# Patient Record
Sex: MALE | Race: WHITE | NOT HISPANIC OR LATINO | URBAN - METROPOLITAN AREA
[De-identification: names, ages, dates, MRNs, and addresses within clinical notes are randomized per-mention and may not be internally consistent; named-entity substitution may affect disease eponyms.]

---

## 2017-02-01 ENCOUNTER — GENERIC CONVERSION - ENCOUNTER (OUTPATIENT)
Dept: OTHER | Facility: OTHER | Age: 62
End: 2017-02-01

## 2023-02-13 ENCOUNTER — OFFICE VISIT (OUTPATIENT)
Dept: INTERNAL MEDICINE CLINIC | Facility: CLINIC | Age: 68
End: 2023-02-13

## 2023-02-13 VITALS
WEIGHT: 249.6 LBS | BODY MASS INDEX: 35.73 KG/M2 | HEIGHT: 70 IN | SYSTOLIC BLOOD PRESSURE: 210 MMHG | HEART RATE: 87 BPM | TEMPERATURE: 99 F | DIASTOLIC BLOOD PRESSURE: 108 MMHG | OXYGEN SATURATION: 96 % | RESPIRATION RATE: 14 BRPM

## 2023-02-13 DIAGNOSIS — F52.21 ERECTILE DYSFUNCTION OF NON-ORGANIC ORIGIN: ICD-10-CM

## 2023-02-13 DIAGNOSIS — E78.00 PURE HYPERCHOLESTEROLEMIA: ICD-10-CM

## 2023-02-13 DIAGNOSIS — Z12.5 SCREENING PSA (PROSTATE SPECIFIC ANTIGEN): ICD-10-CM

## 2023-02-13 DIAGNOSIS — L71.9 ROSACEA: ICD-10-CM

## 2023-02-13 DIAGNOSIS — Z12.11 SCREEN FOR COLON CANCER: Primary | ICD-10-CM

## 2023-02-13 DIAGNOSIS — I10 PRIMARY HYPERTENSION: ICD-10-CM

## 2023-02-13 RX ORDER — LISINOPRIL 10 MG/1
10 TABLET ORAL DAILY
Qty: 90 TABLET | Refills: 3 | Status: SHIPPED | OUTPATIENT
Start: 2023-02-13

## 2023-02-13 NOTE — PROGRESS NOTES
Assessment/Plan:    Problem List Items Addressed This Visit        Cardiovascular and Mediastinum    Hypertension     Start Lisinopril 10 mg QDay  Check Renal function and microalbumin         Relevant Medications    lisinopril (ZESTRIL) 10 mg tablet    Other Relevant Orders    Comprehensive metabolic panel    Lipid Panel with Direct LDL reflex    CBC and differential    TSH, 3rd generation    Microalbumin / creatinine urine ratio       Musculoskeletal and Integument    Rosacea     Declined treatment at this time  Discussed Doxy and MetroGel            Other    Erectile dysfunction of non-organic origin     Declined follow up at this time         Pure hypercholesterolemia     Check the patient Lipid panel noted prior LDL cholesterol was elevated at 149 mg/dL        Other Visit Diagnoses     Screen for colon cancer    -  Primary    Relevant Orders    Cologuard    Screening PSA (prostate specific antigen)        Relevant Orders    PSA, Total Screen           Diagnoses and all orders for this visit:    Screen for colon cancer  -     Cologuard    Primary hypertension  -     Comprehensive metabolic panel; Future  -     Lipid Panel with Direct LDL reflex; Future  -     CBC and differential; Future  -     lisinopril (ZESTRIL) 10 mg tablet; Take 1 tablet (10 mg total) by mouth daily  -     TSH, 3rd generation; Future  -     Cancel: POCT urine microalbumin/creatinine  -     Microalbumin / creatinine urine ratio    Screening PSA (prostate specific antigen)  -     PSA, Total Screen;  Future    Rosacea    Pure hypercholesterolemia    Erectile dysfunction of non-organic origin      Hypertension  Start Lisinopril 10 mg QDay  Check Renal function and microalbumin    Rosacea  Declined treatment at this time  Discussed Doxy and MetroGel    Pure hypercholesterolemia  Check the patient Lipid panel noted prior LDL cholesterol was elevated at 149 mg/dL    Erectile dysfunction of non-organic origin  Declined follow up at this time        Subjective:      Patient ID: Vasile Lama is a 78 y o  male  The patient was seen and examined and noted to have issues with HTN and is follow up secondary to that      The following portions of the patient's history were reviewed and updated as appropriate:   He has no past medical history on file  ,  does not have any pertinent problems on file  ,   has no past surgical history on file  ,  family history is not on file  ,   reports that he has quit smoking  His smoking use included cigarettes  He has been exposed to tobacco smoke  He has never used smokeless tobacco  He reports current alcohol use  He reports that he does not use drugs  ,  has No Known Allergies     Current Outpatient Medications   Medication Sig Dispense Refill   • lisinopril (ZESTRIL) 10 mg tablet Take 1 tablet (10 mg total) by mouth daily 90 tablet 3     No current facility-administered medications for this visit  Review of Systems   Constitutional: Negative for chills, fatigue and fever  HENT: Negative  Respiratory: Negative for cough, chest tightness and shortness of breath  Cardiovascular: Negative for chest pain and palpitations  Gastrointestinal: Negative for abdominal pain, constipation, diarrhea, nausea and vomiting  Genitourinary: Negative  Musculoskeletal: Negative for back pain and myalgias  Skin: Negative  Neurological: Negative  Psychiatric/Behavioral: Negative for dysphoric mood  The patient is not nervous/anxious  Objective:  Vitals:    02/13/23 1421   BP: (!) 210/108   Pulse: 87   Resp: 14   Temp: 99 °F (37 2 °C)   SpO2: 96%   Weight: 113 kg (249 lb 9 6 oz)   Height: 5' 10" (1 778 m)     Body mass index is 35 81 kg/m²  Physical Exam  Vitals and nursing note reviewed  Constitutional:       Appearance: He is well-developed  HENT:      Head: Normocephalic and atraumatic  Eyes:      Pupils: Pupils are equal, round, and reactive to light     Cardiovascular:      Rate and Rhythm: Normal rate and regular rhythm  Heart sounds: Normal heart sounds  No murmur heard  Pulmonary:      Effort: Pulmonary effort is normal       Breath sounds: Normal breath sounds  No stridor  No rales  Abdominal:      General: Bowel sounds are normal  There is no distension  Palpations: Abdomen is soft  Tenderness: There is no abdominal tenderness  Musculoskeletal:         General: No deformity  Normal range of motion  Cervical back: Normal range of motion and neck supple  Skin:     General: Skin is warm and dry  Neurological:      Mental Status: He is alert and oriented to person, place, and time            PHQ-2/9 Depression Screening    Little interest or pleasure in doing things: 0 - not at all  Feeling down, depressed, or hopeless: 0 - not at all  PHQ-2 Score: 0  PHQ-2 Interpretation: Negative depression screen

## 2023-02-14 ENCOUNTER — LAB (OUTPATIENT)
Dept: LAB | Facility: CLINIC | Age: 68
End: 2023-02-14

## 2023-02-14 DIAGNOSIS — I10 PRIMARY HYPERTENSION: ICD-10-CM

## 2023-02-14 DIAGNOSIS — Z12.5 SCREENING PSA (PROSTATE SPECIFIC ANTIGEN): ICD-10-CM

## 2023-02-14 LAB
ALBUMIN SERPL BCP-MCNC: 3.8 G/DL (ref 3.5–5)
ALP SERPL-CCNC: 54 U/L (ref 46–116)
ALT SERPL W P-5'-P-CCNC: 34 U/L (ref 12–78)
ANION GAP SERPL CALCULATED.3IONS-SCNC: 3 MMOL/L (ref 4–13)
AST SERPL W P-5'-P-CCNC: 33 U/L (ref 5–45)
BASOPHILS # BLD AUTO: 0.05 THOUSANDS/ÂΜL (ref 0–0.1)
BASOPHILS NFR BLD AUTO: 1 % (ref 0–1)
BILIRUB SERPL-MCNC: 0.46 MG/DL (ref 0.2–1)
BUN SERPL-MCNC: 19 MG/DL (ref 5–25)
CALCIUM SERPL-MCNC: 9.1 MG/DL (ref 8.3–10.1)
CHLORIDE SERPL-SCNC: 107 MMOL/L (ref 96–108)
CHOLEST SERPL-MCNC: 252 MG/DL
CO2 SERPL-SCNC: 28 MMOL/L (ref 21–32)
CREAT SERPL-MCNC: 0.96 MG/DL (ref 0.6–1.3)
CREAT UR-MCNC: 95.3 MG/DL
EOSINOPHIL # BLD AUTO: 0.07 THOUSAND/ÂΜL (ref 0–0.61)
EOSINOPHIL NFR BLD AUTO: 1 % (ref 0–6)
ERYTHROCYTE [DISTWIDTH] IN BLOOD BY AUTOMATED COUNT: 12.7 % (ref 11.6–15.1)
GFR SERPL CREATININE-BSD FRML MDRD: 82 ML/MIN/1.73SQ M
GLUCOSE P FAST SERPL-MCNC: 98 MG/DL (ref 65–99)
HCT VFR BLD AUTO: 41.2 % (ref 36.5–49.3)
HDLC SERPL-MCNC: 63 MG/DL
HGB BLD-MCNC: 14 G/DL (ref 12–17)
IMM GRANULOCYTES # BLD AUTO: 0.01 THOUSAND/UL (ref 0–0.2)
IMM GRANULOCYTES NFR BLD AUTO: 0 % (ref 0–2)
LDLC SERPL CALC-MCNC: 177 MG/DL (ref 0–100)
LYMPHOCYTES # BLD AUTO: 1.83 THOUSANDS/ÂΜL (ref 0.6–4.47)
LYMPHOCYTES NFR BLD AUTO: 29 % (ref 14–44)
MCH RBC QN AUTO: 29.6 PG (ref 26.8–34.3)
MCHC RBC AUTO-ENTMCNC: 34 G/DL (ref 31.4–37.4)
MCV RBC AUTO: 87 FL (ref 82–98)
MICROALBUMIN UR-MCNC: 9.8 MG/L (ref 0–20)
MICROALBUMIN/CREAT 24H UR: 10 MG/G CREATININE (ref 0–30)
MONOCYTES # BLD AUTO: 0.62 THOUSAND/ÂΜL (ref 0.17–1.22)
MONOCYTES NFR BLD AUTO: 10 % (ref 4–12)
NEUTROPHILS # BLD AUTO: 3.68 THOUSANDS/ÂΜL (ref 1.85–7.62)
NEUTS SEG NFR BLD AUTO: 59 % (ref 43–75)
NRBC BLD AUTO-RTO: 0 /100 WBCS
PLATELET # BLD AUTO: 212 THOUSANDS/UL (ref 149–390)
PMV BLD AUTO: 10.9 FL (ref 8.9–12.7)
POTASSIUM SERPL-SCNC: 4.6 MMOL/L (ref 3.5–5.3)
PROT SERPL-MCNC: 6.8 G/DL (ref 6.4–8.4)
PSA SERPL-MCNC: 1.1 NG/ML (ref 0–4)
RBC # BLD AUTO: 4.73 MILLION/UL (ref 3.88–5.62)
SODIUM SERPL-SCNC: 138 MMOL/L (ref 135–147)
TRIGL SERPL-MCNC: 59 MG/DL
TSH SERPL DL<=0.05 MIU/L-ACNC: 3.34 UIU/ML (ref 0.45–4.5)
WBC # BLD AUTO: 6.26 THOUSAND/UL (ref 4.31–10.16)

## 2023-02-15 NOTE — RESULT ENCOUNTER NOTE
LDL cholesterol is noted to be elevated  Other labs are noted to be good  We will discuss at our next follow up

## 2023-05-15 ENCOUNTER — OFFICE VISIT (OUTPATIENT)
Dept: INTERNAL MEDICINE CLINIC | Facility: CLINIC | Age: 68
End: 2023-05-15

## 2023-05-15 VITALS
HEART RATE: 72 BPM | WEIGHT: 245.6 LBS | TEMPERATURE: 98.6 F | SYSTOLIC BLOOD PRESSURE: 156 MMHG | RESPIRATION RATE: 12 BRPM | DIASTOLIC BLOOD PRESSURE: 80 MMHG | HEIGHT: 70 IN | OXYGEN SATURATION: 97 % | BODY MASS INDEX: 35.16 KG/M2

## 2023-05-15 DIAGNOSIS — Z00.00 MEDICARE ANNUAL WELLNESS VISIT, SUBSEQUENT: Primary | ICD-10-CM

## 2023-05-15 DIAGNOSIS — R09.89 BRUIT OF RIGHT CAROTID ARTERY: ICD-10-CM

## 2023-05-15 DIAGNOSIS — E78.00 PURE HYPERCHOLESTEROLEMIA: ICD-10-CM

## 2023-05-15 DIAGNOSIS — I10 PRIMARY HYPERTENSION: ICD-10-CM

## 2023-05-15 RX ORDER — LISINOPRIL 10 MG/1
10 TABLET ORAL DAILY
Qty: 90 TABLET | Refills: 3 | Status: SHIPPED | OUTPATIENT
Start: 2023-05-15

## 2023-05-15 RX ORDER — ROSUVASTATIN CALCIUM 5 MG/1
5 TABLET, COATED ORAL DAILY
Qty: 90 TABLET | Refills: 1 | Status: SHIPPED | OUTPATIENT
Start: 2023-05-15 | End: 2023-11-11

## 2023-05-15 NOTE — PROGRESS NOTES
Assessment and Plan:     Problem List Items Addressed This Visit        Cardiovascular and Mediastinum    Hypertension     Refilled the Lisinopril 10 mg QDay         Relevant Medications    lisinopril (ZESTRIL) 10 mg tablet       Other    Pure hypercholesterolemia - Primary     Started the Crestor 5 mg QDay #90  Follow up LDL cholesterol in 1 month         Relevant Medications    rosuvastatin (CRESTOR) 5 mg tablet    Other Relevant Orders    LDL cholesterol, direct   Other Visit Diagnoses     Bruit of right carotid artery        Cortid doppler ordered secondary to right sided bruit  Relevant Orders    VAS carotid complete study           Preventive health issues were discussed with patient, and age appropriate screening tests were ordered as noted in patient's After Visit Summary  Personalized health advice and appropriate referrals for health education or preventive services given if needed, as noted in patient's After Visit Summary  History of Present Illness:     Patient presents for a Medicare Wellness Visit    The patient's labs noted an elevated LDL cholesterol     Patient Care Team:  Michelet Banegas DO as PCP - Vishal Boone MD     Review of Systems:     Review of Systems   Constitutional: Negative for chills, fatigue and fever  HENT: Negative  Respiratory: Negative for cough, chest tightness and shortness of breath  Cardiovascular: Negative for chest pain and palpitations  Gastrointestinal: Negative for abdominal pain, constipation, diarrhea, nausea and vomiting  Genitourinary: Negative  Musculoskeletal: Negative for back pain and myalgias  Skin: Negative  Neurological: Negative  Psychiatric/Behavioral: Negative for dysphoric mood  The patient is not nervous/anxious           Problem List:     Patient Active Problem List   Diagnosis   • Erectile dysfunction of non-organic origin   • Hypertension   • Pure hypercholesterolemia   • Rosacea      Past Medical and Surgical History:     History reviewed  No pertinent past medical history  History reviewed  No pertinent surgical history  Family History:     Family History   Problem Relation Age of Onset   • Dementia Mother    • Heart disease Mother    • Stroke Father    • Heart disease Father       Social History:     Social History     Socioeconomic History   • Marital status: /Civil Union     Spouse name: None   • Number of children: None   • Years of education: None   • Highest education level: None   Occupational History   • None   Tobacco Use   • Smoking status: Former     Types: Cigarettes     Passive exposure: Past   • Smokeless tobacco: Never   Substance and Sexual Activity   • Alcohol use: Yes     Comment: social   • Drug use: Never   • Sexual activity: Not Currently   Other Topics Concern   • None   Social History Narrative   • None     Social Determinants of Health     Financial Resource Strain: Low Risk    • Difficulty of Paying Living Expenses: Not hard at all   Food Insecurity: Not on file   Transportation Needs: No Transportation Needs   • Lack of Transportation (Medical): No   • Lack of Transportation (Non-Medical): No   Physical Activity: Not on file   Stress: Not on file   Social Connections: Not on file   Intimate Partner Violence: Not on file   Housing Stability: Not on file      Medications and Allergies:     Current Outpatient Medications   Medication Sig Dispense Refill   • lisinopril (ZESTRIL) 10 mg tablet Take 1 tablet (10 mg total) by mouth daily 90 tablet 3   • rosuvastatin (CRESTOR) 5 mg tablet Take 1 tablet (5 mg total) by mouth daily 90 tablet 1     No current facility-administered medications for this visit       No Known Allergies   Immunizations:     Immunization History   Administered Date(s) Administered   • COVID-19 MODERNA VACC 0 5 ML IM 01/23/2021, 02/20/2021, 10/30/2021, 07/24/2022   • COVID-19 Moderna Vac BIVALENT 12 Yr+ IM (BOOSTER ONLY) 0 5 ML 10/24/2022   • DT (pediatric) 04/19/2012   • INFLUENZA 10/17/2014, 10/24/2022   • Influenza, seasonal, injectable 01/10/2013   • Pneumococcal Conjugate Vaccine 20-valent (Pcv20), Polysace 10/24/2022      Health Maintenance:         Topic Date Due   • Hepatitis C Screening  Never done   • Colorectal Cancer Screening  Never done     There are no preventive care reminders to display for this patient  Medicare Screening Tests and Risk Assessments:     Nato Barillas is here for his Initial Wellness visit  Health Risk Assessment:   Patient rates overall health as good  Patient feels that their physical health rating is same  Patient is very satisfied with their life  Eyesight was rated as same  Hearing was rated as slightly worse  Patient feels that their emotional and mental health rating is same  Patients states they are sometimes angry  Patient states they are often unusually tired/fatigued  Pain experienced in the last 7 days has been some  Patient's pain rating has been 2/10  Patient states that he has experienced no weight loss or gain in last 6 months  Depression Screening:   PHQ-2 Score: 1      Fall Risk Screening: In the past year, patient has experienced: no history of falling in past year      Home Safety:  Patient does not have trouble with stairs inside or outside of their home  Patient has no working smoke alarms and has working carbon monoxide detector  Home safety hazards include: none  Nutrition:   Current diet is Frequent junk food and Other (please comment)  high fat content    Medications:   Patient is currently taking over-the-counter supplements  OTC medications include: Aleve (not daily)  Patient is able to manage medications  Activities of Daily Living (ADLs)/Instrumental Activities of Daily Living (IADLs):   Walk and transfer into and out of bed and chair?: Yes  Dress and groom yourself?: Yes    Bathe or shower yourself?: Yes    Feed yourself?  Yes  Do your laundry/housekeeping?: Yes  Manage your money, pay your bills and track your expenses?: Yes  Make your own meals?: Yes    Do your own shopping?: Yes    Durable Medical Equipment Suppliers  None    Previous Hospitalizations:   Any hospitalizations or ED visits within the last 12 months?: No      Advance Care Planning:   Living will: Yes    Durable POA for healthcare: Yes    Advanced directive: Yes    Advanced directive counseling given: No    Five wishes given: No    Patient declined ACP directive: No    End of Life Decisions reviewed with patient: No    Provider agrees with end of life decisions: Yes      Cognitive Screening:   Provider or family/friend/caregiver concerned regarding cognition?: No    PREVENTIVE SCREENINGS      Cardiovascular Screening:    General: Screening Not Indicated and History Lipid Disorder    Due for: Lipid Panel      Diabetes Screening:     General: Screening Current      Colorectal Cancer Screening:       Due for: Cologuard      Prostate Cancer Screening:    General: Screening Current      Osteoporosis Screening:    General: Screening Not Indicated      Abdominal Aortic Aneurysm (AAA) Screening:    Risk factors include: age between 73-69 yo and tobacco use        General: Patient Declines      Lung Cancer Screening:     General: Screening Not Indicated      Hepatitis C Screening:    General: Screening Not Indicated    Screening, Brief Intervention, and Referral to Treatment (SBIRT)    Screening  Typical number of drinks in a day: 2  Typical number of drinks in a week: 10  Interpretation: Low risk drinking behavior  AUDIT-C Screenin) How often did you have a drink containing alcohol in the past year? 4 or more times a week  2) How many drinks did you have on a typical day when you were drinking in the past year?  1 to 2  3) How often did you have 6 or more drinks on one occasion in the past year? less than monthly    AUDIT-C Score: 5  Interpretation: Score 4-12 (male): POSITIVE screen for alcohol misuse    AUDIT Screenin) How often "during the last year have you found that you were not able to stop drinking once you had started? 0 - never  5) How often during the last year have you failed to do what was normally expected from you because of drinking? 0 - never  6) How often during the last year have you needed a first drink in the morning to get yourself going after a heavy drinking session? 0 - never  7) How often during the last year have you had a feeling of guilt or remorse after drinking? 1 - less than monthly  8) How often during the last year have you been unable to remember what happened the night before because you had been drinking? 0 - never  9) Have you or someone else been injured as a result of your drinking? 0 - no  10) Has a relative or friend or a doctor or another health worker been concerned about your drinking or suggested you cut down? 0 - no    AUDIT Score: 6  Interpretation: Low risk alcohol consumption    Single Item Drug Screening:  How often have you used an illegal drug (including marijuana) or a prescription medication for non-medical reasons in the past year? never    Single Item Drug Screen Score: 0  Interpretation: Negative screen for possible drug use disorder    No results found  Physical Exam:     /80   Pulse 72   Temp 98 6 °F (37 °C)   Resp 12   Ht 5' 10\" (1 778 m)   Wt 111 kg (245 lb 9 6 oz)   SpO2 97%   BMI 35 24 kg/m²     Physical Exam  Vitals and nursing note reviewed  Constitutional:       General: He is not in acute distress  Appearance: He is well-developed  HENT:      Head: Normocephalic and atraumatic  Eyes:      Conjunctiva/sclera: Conjunctivae normal    Neck:      Vascular: Carotid bruit present  Cardiovascular:      Rate and Rhythm: Normal rate and regular rhythm  Heart sounds: No murmur heard  Pulmonary:      Effort: Pulmonary effort is normal  No respiratory distress  Breath sounds: Normal breath sounds  Abdominal:      Palpations: Abdomen is soft        " Tenderness: There is no abdominal tenderness  Musculoskeletal:         General: No swelling  Cervical back: Neck supple  Skin:     General: Skin is warm and dry  Capillary Refill: Capillary refill takes less than 2 seconds  Neurological:      Mental Status: He is alert     Psychiatric:         Mood and Affect: Mood normal           Adina Craig DO

## 2023-05-15 NOTE — PATIENT INSTRUCTIONS
Medicare Preventive Visit Patient Instructions  Thank you for completing your Welcome to Medicare Visit or Medicare Annual Wellness Visit today  Your next wellness visit will be due in one year (5/15/2024)  The screening/preventive services that you may require over the next 5-10 years are detailed below  Some tests may not apply to you based off risk factors and/or age  Screening tests ordered at today's visit but not completed yet may show as past due  Also, please note that scanned in results may not display below  Preventive Screenings:  Service Recommendations Previous Testing/Comments   Colorectal Cancer Screening  · Colonoscopy    · Fecal Occult Blood Test (FOBT)/Fecal Immunochemical Test (FIT)  · Fecal DNA/Cologuard Test  · Flexible Sigmoidoscopy Age: 39-70 years old   Colonoscopy: every 10 years (May be performed more frequently if at higher risk)  OR  FOBT/FIT: every 1 year  OR  Cologuard: every 3 years  OR  Sigmoidoscopy: every 5 years  Screening may be recommended earlier than age 39 if at higher risk for colorectal cancer  Also, an individualized decision between you and your healthcare provider will decide whether screening between the ages of 74-80 would be appropriate   Colonoscopy: Not on file  FOBT/FIT: Not on file  Cologuard: Not on file  Sigmoidoscopy: Not on file          Prostate Cancer Screening Individualized decision between patient and health care provider in men between ages of 53-78   Medicare will cover every 12 months beginning on the day after your 50th birthday PSA: 1 1 ng/mL     Screening Current     Hepatitis C Screening Once for adults born between [facility]  More frequently in patients at high risk for Hepatitis C Hep C Antibody: Not on file        Diabetes Screening 1-2 times per year if you're at risk for diabetes or have pre-diabetes Fasting glucose: 98 mg/dL (2/14/2023)  A1C: No results in last 5 years (No results in last 5 years)  Screening Current   Cholesterol Screening Once every 5 years if you don't have a lipid disorder  May order more often based on risk factors  Lipid panel: 02/14/2023  Screening Not Indicated  History Lipid Disorder      Other Preventive Screenings Covered by Medicare:  1  Abdominal Aortic Aneurysm (AAA) Screening: covered once if your at risk  You're considered to be at risk if you have a family history of AAA or a male between the age of 73-68 who smoking at least 100 cigarettes in your lifetime  2  Lung Cancer Screening: covers low dose CT scan once per year if you meet all of the following conditions: (1) Age 50-69; (2) No signs or symptoms of lung cancer; (3) Current smoker or have quit smoking within the last 15 years; (4) You have a tobacco smoking history of at least 20 pack years (packs per day x number of years you smoked); (5) You get a written order from a healthcare provider  3  Glaucoma Screening: covered annually if you're considered high risk: (1) You have diabetes OR (2) Family history of glaucoma OR (3)  aged 48 and older OR (3)  American aged 72 and older  3  Osteoporosis Screening: covered every 2 years if you meet one of the following conditions: (1) Have a vertebral abnormality; (2) On glucocorticoid therapy for more than 3 months; (3) Have primary hyperparathyroidism; (4) On osteoporosis medications and need to assess response to drug therapy  5  HIV Screening: covered annually if you're between the age of 12-76  Also covered annually if you are younger than 13 and older than 72 with risk factors for HIV infection  For pregnant patients, it is covered up to 3 times per pregnancy      Immunizations:  Immunization Recommendations   Influenza Vaccine Annual influenza vaccination during flu season is recommended for all persons aged >= 6 months who do not have contraindications   Pneumococcal Vaccine   * Pneumococcal conjugate vaccine = PCV13 (Prevnar 13), PCV15 (Vaxneuvance), PCV20 (Prevnar 20)  * Pneumococcal polysaccharide vaccine = PPSV23 (Pneumovax) Adults 2364 years old: 1-3 doses may be recommended based on certain risk factors  Adults 72 years old: 1-2 doses may be recommended based off what pneumonia vaccine you previously received   Hepatitis B Vaccine 3 dose series if at intermediate or high risk (ex: diabetes, end stage renal disease, liver disease)   Tetanus (Td) Vaccine - COST NOT COVERED BY MEDICARE PART B Following completion of primary series, a booster dose should be given every 10 years to maintain immunity against tetanus  Td may also be given as tetanus wound prophylaxis  Tdap Vaccine - COST NOT COVERED BY MEDICARE PART B Recommended at least once for all adults  For pregnant patients, recommended with each pregnancy  Shingles Vaccine (Shingrix) - COST NOT COVERED BY MEDICARE PART B  2 shot series recommended in those aged 48 and above     Health Maintenance Due:      Topic Date Due   • Hepatitis C Screening  Never done   • Colorectal Cancer Screening  Never done     Immunizations Due:  There are no preventive care reminders to display for this patient  Advance Directives   What are advance directives? Advance directives are legal documents that state your wishes and plans for medical care  These plans are made ahead of time in case you lose your ability to make decisions for yourself  Advance directives can apply to any medical decision, such as the treatments you want, and if you want to donate organs  What are the types of advance directives? There are many types of advance directives, and each state has rules about how to use them  You may choose a combination of any of the following:  · Living will: This is a written record of the treatment you want  You can also choose which treatments you do not want, which to limit, and which to stop at a certain time  This includes surgery, medicine, IV fluid, and tube feedings     · Durable power of  for healthcare [facility]): This is a written record that states who you want to make healthcare choices for you when you are unable to make them for yourself  This person, called a proxy, is usually a family member or a friend  You may choose more than 1 proxy  · Do not resuscitate (DNR) order:  A DNR order is used in case your heart stops beating or you stop breathing  It is a request not to have certain forms of treatment, such as CPR  A DNR order may be included in other types of advance directives  · Medical directive: This covers the care that you want if you are in a coma, near death, or unable to make decisions for yourself  You can list the treatments you want for each condition  Treatment may include pain medicine, surgery, blood transfusions, dialysis, IV or tube feedings, and a ventilator (breathing machine)  · Values history: This document has questions about your views, beliefs, and how you feel and think about life  This information can help others choose the care that you would choose  Why are advance directives important? An advance directive helps you control your care  Although spoken wishes may be used, it is better to have your wishes written down  Spoken wishes can be misunderstood, or not followed  Treatments may be given even if you do not want them  An advance directive may make it easier for your family to make difficult choices about your care  Weight Management   Why it is important to manage your weight:  Being overweight increases your risk of health conditions such as heart disease, high blood pressure, type 2 diabetes, and certain types of cancer  It can also increase your risk for osteoarthritis, sleep apnea, and other respiratory problems  Aim for a slow, steady weight loss  Even a small amount of weight loss can lower your risk of health problems  How to lose weight safely:  A safe and healthy way to lose weight is to eat fewer calories and get regular exercise   You can lose up about 1 pound a week by decreasing the number of calories you eat by 500 calories each day  Healthy meal plan for weight management:  A healthy meal plan includes a variety of foods, contains fewer calories, and helps you stay healthy  A healthy meal plan includes the following:  · Eat whole-grain foods more often  A healthy meal plan should contain fiber  Fiber is the part of grains, fruits, and vegetables that is not broken down by your body  Whole-grain foods are healthy and provide extra fiber in your diet  Some examples of whole-grain foods are whole-wheat breads and pastas, oatmeal, brown rice, and bulgur  · Eat a variety of vegetables every day  Include dark, leafy greens such as spinach, kale, simi greens, and mustard greens  Eat yellow and orange vegetables such as carrots, sweet potatoes, and winter squash  · Eat a variety of fruits every day  Choose fresh or canned fruit (canned in its own juice or light syrup) instead of juice  Fruit juice has very little or no fiber  · Eat low-fat dairy foods  Drink fat-free (skim) milk or 1% milk  Eat fat-free yogurt and low-fat cottage cheese  Try low-fat cheeses such as mozzarella and other reduced-fat cheeses  · Choose meat and other protein foods that are low in fat  Choose beans or other legumes such as split peas or lentils  Choose fish, skinless poultry (chicken or turkey), or lean cuts of red meat (beef or pork)  Before you cook meat or poultry, cut off any visible fat  · Use less fat and oil  Try baking foods instead of frying them  Add less fat, such as margarine, sour cream, regular salad dressing and mayonnaise to foods  Eat fewer high-fat foods  Some examples of high-fat foods include french fries, doughnuts, ice cream, and cakes  · Eat fewer sweets  Limit foods and drinks that are high in sugar  This includes candy, cookies, regular soda, and sweetened drinks  Exercise:  Exercise at least 30 minutes per day on most days of the week   Some examples of "exercise include walking, biking, dancing, and swimming  You can also fit in more physical activity by taking the stairs instead of the elevator or parking farther away from stores  Ask your healthcare provider about the best exercise plan for you  Alcohol Use and Your Health    Drinking too much can harm your health  Excessive alcohol use leads to about 88,000 death in the [place] each year, and shortens the life of those who diet by almost 30 years  Further, excessive drinking cost the economy $249 billion in 2010  Most excessive drinkers are not alcohol dependent  Excessive alcohol use has immediate effects that increase the risk of many harmful health conditions  These are most often the result of binge drinking  Over time, excessive alcohol use can lead to the development of chronic diseases and other series health problems  What is considered a \"drink\"? Excessive alcohol use includes:  · Binge Drinking: For women, 4 or more drinks consumed on one occasion  For men, 5 or more drinks consumed on one occasion  · Heavy Drinking: For women, 8 or more drinks per week  For men, 15 or more drinks per week  · Any alcohol used by pregnant women  · Any alcohol used by those under the age of 21 years    If you choose to drink, do so in moderation:  · Do not drink at all if you are under the age of 24, or if you are or may be pregnant, or have health problems that could be made worse by drinking    · For women, up to 1 drink per day  · For men, up to 2 drinks a day    No one should begin drinking or drink more frequently based on potential health benefits    Short-Term Health Risks:  · Injuries: motor vehicle crashes, falls, drownings, burns  · Violence: homicide, suicide, sexual assault, intimate partner violence  · Alcohol poisoning  · Reproductive health: risky sexual behaviors, unintended prengnacy, sexually transmitted diseases, miscarriage, stillbirth, fetal alcohol syndrome    Long-Term " Health Risks:  · Chronic diseases: high blood pressure, heart disease, stroke, liver disease, digestive problems  · Cancers: breast, mouth and throat, liver, colon  · Learning and memory problems: dementia, poor school performance  · Mental health: depression, anxiety, insomnia  · Social problems: lost productivity, family problems, unemployment  · Alcohol dependence    For support and more information:  · Substance Abuse and [facility] [address] [place]  Web Address: https://[employer]/    · Alcoholics Anonymous        Web Address: http://www ap[employer] info/    https://www cdc gov/alcohol/fact-sheets/alcohol-use htm     © [address] 2018 Information is for End User's use only and may not be sold, redistributed or otherwise used for commercial purposes   All illustrations and images included in CareNotes® are the copyrighted property of A D A M , Inc  or [address] [place] [address] [place]

## 2023-06-05 ENCOUNTER — HOSPITAL ENCOUNTER (OUTPATIENT)
Dept: NON INVASIVE DIAGNOSTICS | Facility: HOSPITAL | Age: 68
Discharge: HOME | End: 2023-06-05
Payer: MEDICARE

## 2023-06-05 DIAGNOSIS — R09.89 BRUIT OF RIGHT CAROTID ARTERY: ICD-10-CM

## 2023-06-05 PROCEDURE — 93880 EXTRACRANIAL BILAT STUDY: CPT

## 2023-06-05 PROCEDURE — 93880 EXTRACRANIAL BILAT STUDY: CPT | Performed by: SURGERY

## 2023-06-05 NOTE — RESULT ENCOUNTER NOTE
Carotid doppler notes bilateral plaques in the carotid artery, but they are not big enough to do anything other than follow up in one year to recheck

## 2023-11-13 ENCOUNTER — LAB (OUTPATIENT)
Dept: LAB | Facility: CLINIC | Age: 68
End: 2023-11-13
Payer: MEDICARE

## 2023-11-13 DIAGNOSIS — E78.00 PURE HYPERCHOLESTEROLEMIA: ICD-10-CM

## 2023-11-13 LAB — LDLC SERPL DIRECT ASSAY-MCNC: 124 MG/DL (ref 0–100)

## 2023-11-13 PROCEDURE — 83721 ASSAY OF BLOOD LIPOPROTEIN: CPT

## 2023-11-13 PROCEDURE — 36415 COLL VENOUS BLD VENIPUNCTURE: CPT

## 2023-11-14 NOTE — RESULT ENCOUNTER NOTE
Attempted to contact mom regarding missed appointment today with Dr. Baljeet Magaña. Reached voicemail, and was unable to leave a message due to voicemail box being full. Will have team reach out again to get follow up scheduled. Dr. Magaña recommends type 2 clinic if possible.    Kortney De La O, RN, [facility]  Pediatric Diabetes Educator  RN Care Coordinator   Ph: [phone]  Fax: [phone]     LDL cholesterol noted to be mildly elevated (LDL cholesterol 124 mg/dL)

## 2023-11-21 ENCOUNTER — OFFICE VISIT (OUTPATIENT)
Dept: INTERNAL MEDICINE CLINIC | Facility: CLINIC | Age: 68
End: 2023-11-21
Payer: MEDICARE

## 2023-11-21 VITALS
TEMPERATURE: 96.8 F | DIASTOLIC BLOOD PRESSURE: 92 MMHG | HEIGHT: 70 IN | OXYGEN SATURATION: 99 % | WEIGHT: 236 LBS | HEART RATE: 74 BPM | SYSTOLIC BLOOD PRESSURE: 146 MMHG | BODY MASS INDEX: 33.79 KG/M2

## 2023-11-21 DIAGNOSIS — Z12.5 SCREENING PSA (PROSTATE SPECIFIC ANTIGEN): ICD-10-CM

## 2023-11-21 DIAGNOSIS — E78.00 PURE HYPERCHOLESTEROLEMIA: ICD-10-CM

## 2023-11-21 DIAGNOSIS — J11.1 INFLUENZA: ICD-10-CM

## 2023-11-21 DIAGNOSIS — Z13.1 SCREENING FOR DIABETES MELLITUS: ICD-10-CM

## 2023-11-21 DIAGNOSIS — I10 PRIMARY HYPERTENSION: ICD-10-CM

## 2023-11-21 DIAGNOSIS — N52.8 OTHER MALE ERECTILE DYSFUNCTION: ICD-10-CM

## 2023-11-21 DIAGNOSIS — Z13.0 SCREENING, ANEMIA, DEFICIENCY, IRON: ICD-10-CM

## 2023-11-21 DIAGNOSIS — Z13.220 SCREENING FOR HYPERCHOLESTEROLEMIA: ICD-10-CM

## 2023-11-21 DIAGNOSIS — K64.2 GRADE III HEMORRHOIDS: Primary | ICD-10-CM

## 2023-11-21 DIAGNOSIS — Z12.11 SCREENING FOR COLON CANCER: ICD-10-CM

## 2023-11-21 PROCEDURE — 99214 OFFICE O/P EST MOD 30 MIN: CPT | Performed by: INTERNAL MEDICINE

## 2023-11-21 RX ORDER — LISINOPRIL 20 MG/1
20 TABLET ORAL DAILY
Qty: 90 TABLET | Refills: 3 | Status: SHIPPED | OUTPATIENT
Start: 2023-11-21 | End: 2024-11-15

## 2023-11-21 RX ORDER — ROSUVASTATIN CALCIUM 5 MG/1
5 TABLET, COATED ORAL DAILY
Qty: 90 TABLET | Refills: 3 | Status: SHIPPED | OUTPATIENT
Start: 2023-11-21 | End: 2024-11-15

## 2023-11-21 RX ORDER — SILDENAFIL 100 MG/1
100 TABLET, FILM COATED ORAL DAILY PRN
Qty: 10 TABLET | Refills: 0 | Status: SHIPPED | OUTPATIENT
Start: 2023-11-21

## 2023-11-21 NOTE — PROGRESS NOTES
Assessment/Plan:    Problem List Items Addressed This Visit     Hypertension     Increased the dose of the Lisinopril to 20 mg Qday  BUN/Creatinine pending  Microalbumin pending. Relevant Medications    lisinopril (ZESTRIL) 20 mg tablet    Other Relevant Orders    Albumin / creatinine urine ratio    Pure hypercholesterolemia     Continue the Rosuvastatin 5 mg Qday  Check the Lipid panel. Relevant Medications    rosuvastatin (CRESTOR) 5 mg tablet   Other Visit Diagnoses     Grade III hemorrhoids    -  Primary    Follow up with Rectal surgery    Relevant Orders    Ambulatory Referral to Colorectal Surgery    Other male erectile dysfunction        Trial of Viagra 100 mg 1/2-1 Tab PRN 1 hour before sex. Relevant Medications    sildenafil (VIAGRA) 100 mg tablet    Screening for colon cancer        Relevant Orders    Cologuard    Screening for hypercholesterolemia        Relevant Orders    Lipid Panel with Direct LDL reflex    Screening PSA (prostate specific antigen)        PSA ordered    Relevant Orders    PSA, Total Screen    Screening for diabetes mellitus        Relevant Orders    Comprehensive metabolic panel    Influenza        Screening, anemia, deficiency, iron        CBC ordered    Relevant Orders    CBC and differential           Diagnoses and all orders for this visit:    Grade III hemorrhoids  Comments: Follow up with Rectal surgery  Orders:  -     Ambulatory Referral to Colorectal Surgery; Future    Primary hypertension  -     lisinopril (ZESTRIL) 20 mg tablet; Take 1 tablet (20 mg total) by mouth daily  -     Albumin / creatinine urine ratio; Future    Pure hypercholesterolemia  -     rosuvastatin (CRESTOR) 5 mg tablet; Take 1 tablet (5 mg total) by mouth daily    Other male erectile dysfunction  Comments:  Trial of Viagra 100 mg 1/2-1 Tab PRN 1 hour before sex. Orders:  -     sildenafil (VIAGRA) 100 mg tablet;  Take 1 tablet (100 mg total) by mouth daily as needed for erectile dysfunction    Screening for colon cancer  -     Cologuard    Screening for hypercholesterolemia  -     Lipid Panel with Direct LDL reflex; Future    Screening PSA (prostate specific antigen)  Comments:  PSA ordered  Orders:  -     PSA, Total Screen; Future    Screening for diabetes mellitus  -     Comprehensive metabolic panel; Future    Influenza    Screening, anemia, deficiency, iron  Comments:  CBC ordered  Orders:  -     CBC and differential; Future        Hypertension  Increased the dose of the Lisinopril to 20 mg Qday  BUN/Creatinine pending  Microalbumin pending. Pure hypercholesterolemia  Continue the Rosuvastatin 5 mg Qday  Check the Lipid panel. Subjective:      Patient ID: Rahel Martinez is a 80 y.o. male. The patient notes issues with ED. His BP was noted to be elevated and we will need to follow up on that. He will need labs for 2024 and we will follow up on them in after 2/15/2024. Cologuard reordered. He would like to see Colorectal surgery for his hemorrhoids. The following portions of the patient's history were reviewed and updated as appropriate:   He has a past medical history of Hypertension (2/14/2023) and Obesity (2/14/2023). ,  does not have any pertinent problems on file. ,   has no past surgical history on file. ,  family history includes Dementia in his mother; Hearing loss in his father; Heart disease in his father and mother; Stroke in his father. ,   reports that he has quit smoking. His smoking use included cigarettes. He has been exposed to tobacco smoke. He has never used smokeless tobacco. He reports current alcohol use. He reports that he does not use drugs. ,  has No Known Allergies. .  Current Outpatient Medications   Medication Sig Dispense Refill   • lisinopril (ZESTRIL) 20 mg tablet Take 1 tablet (20 mg total) by mouth daily 90 tablet 3   • rosuvastatin (CRESTOR) 5 mg tablet Take 1 tablet (5 mg total) by mouth daily 90 tablet 3   • sildenafil (VIAGRA) 100 mg tablet Take 1 tablet (100 mg total) by mouth daily as needed for erectile dysfunction 10 tablet 0     No current facility-administered medications for this visit. Review of Systems   Constitutional:  Negative for chills, fatigue and fever. HENT: Negative. Respiratory:  Negative for cough, chest tightness and shortness of breath. Cardiovascular:  Negative for chest pain and palpitations. Gastrointestinal:  Negative for abdominal pain, constipation, diarrhea, nausea and vomiting. Genitourinary: Negative. Musculoskeletal:  Negative for back pain and myalgias. Skin: Negative. Neurological: Negative. Psychiatric/Behavioral:  Negative for dysphoric mood. The patient is not nervous/anxious. Objective:  Vitals:    11/21/23 0942   BP: 146/92   Pulse: 74   Temp: (!) 96.8 °F (36 °C)   SpO2: 99%   Weight: 107 kg (236 lb)   Height: 5' 10" (1.778 m)     Body mass index is 33.86 kg/m². Physical Exam  Vitals and nursing note reviewed. Constitutional:       Appearance: He is well-developed. HENT:      Head: Normocephalic and atraumatic. Eyes:      Pupils: Pupils are equal, round, and reactive to light. Cardiovascular:      Rate and Rhythm: Normal rate and regular rhythm. Heart sounds: Normal heart sounds. No murmur heard. Pulmonary:      Effort: Pulmonary effort is normal.      Breath sounds: Normal breath sounds. No stridor. No rales. Abdominal:      General: Bowel sounds are normal. There is no distension. Palpations: Abdomen is soft. Tenderness: There is no abdominal tenderness. Musculoskeletal:         General: No deformity. Normal range of motion. Cervical back: Normal range of motion and neck supple. Skin:     General: Skin is warm and dry. Neurological:      Mental Status: He is alert and oriented to person, place, and time.           PHQ-2/9 Depression Screening    Little interest or pleasure in doing things: 0 - not at all  Feeling down, depressed, or hopeless: 0 - not at all  PHQ-2 Score: 0  PHQ-2 Interpretation: Negative depression screen

## 2024-01-18 NOTE — PROGRESS NOTES
Colon and Rectal Surgery   Fausto Burk 68 y.o. male MRN [id number]  Encounter: [id number]  01/22/24 9:55 AM            Assessment: Fausto Burk is a 68 y.o. male who has bleeding hemorrhoids      Plan:   Bleeding hemorrhoids  The patient has very large and prolapsing internal and external hemorrhoids.  He has bleeding and discomfort that has been present for many years.    I offered medical management versus surgery.  He really wants to have an operation done to minimize the hemorrhoidal problem.  His hemorrhoids are quite large and I think surgery is reasonable.  Risks, not limited to infection, bleeding, pain, persistent disease, need for future surgery, fecal incontinence, urinary retention, or nonhealing wounds were reviewed at length.  Questions were answered.  We will proceed with hemorrhoidectomy.      Subjective     HPI    Fausto Burk is a 68 y.o. male who is referred today by Dr. Ramakrishna Lopez for grade lll hemorrhoids.     The patient had a colonoscopy on 4/19/2013 with Dr. Missael Tavera, showing four diminutive polyps found in the transverse colon, removed, and mild diverticulosis in the sigmoid colon. 3 year recall. (Report under media).      Historical Information   Past Medical History:   Diagnosis Date    Hypertension 2/14/2023    Obesity 2/14/2023     No past surgical history on file.    Meds/Allergies       Current Outpatient Medications:     lisinopril (ZESTRIL) 20 mg tablet, Take 1 tablet (20 mg total) by mouth daily, Disp: 90 tablet, Rfl: 3    rosuvastatin (CRESTOR) 5 mg tablet, Take 1 tablet (5 mg total) by mouth daily, Disp: 90 tablet, Rfl: 3    sildenafil (VIAGRA) 100 mg tablet, Take 1 tablet (100 mg total) by mouth daily as needed for erectile dysfunction, Disp: 10 tablet, Rfl: 0  No Known Allergies    Social History   Social History     Substance and Sexual Activity   Drug Use Never     Social History     Tobacco Use   Smoking Status Former    Types: Cigarettes    Passive  "exposure: Past   Smokeless Tobacco Never         Family History   Problem Relation Age of Onset    Dementia Mother     Heart disease Mother     Stroke Father     Heart disease Father     Hearing loss Father          Review of Systems   Constitutional: Negative.    Gastrointestinal:  Positive for abdominal distention, anal bleeding and constipation. Negative for abdominal pain, diarrhea, nausea and rectal pain.       Objective   Current Vitals:  Vitals:    01/22/24 0920   Pulse: 66   Temp: 97.8 °F (36.6 °C)   TempSrc: Tympanic   SpO2: 98%   Weight: 110 kg (242 lb)   Height: 5' 10\" (1.778 m)         Physical Exam  Constitutional:       Appearance: Normal appearance.   Cardiovascular:      Rate and Rhythm: Normal rate and regular rhythm.   Pulmonary:      Effort: Pulmonary effort is normal.      Breath sounds: Normal breath sounds.   Abdominal:      General: Abdomen is flat.      Palpations: Abdomen is soft. There is no mass.      Tenderness: There is no abdominal tenderness. There is no guarding.      Hernia: No hernia is present.   Genitourinary:     Prostate: Normal.      Comments: There are very large, circumferential external hemorrhoids measuring up to 2 or 3 cm in diameter and length.  Internal hemorrhoidal prolapse is obvious.  Digital exam was otherwise unremarkable.  The prostate is nonnodular but  Neurological:      General: No focal deficit present.      Mental Status: He is alert and oriented to person, place, and time.   Psychiatric:         Mood and Affect: Mood normal.         Behavior: Behavior normal.                 "

## 2024-01-22 ENCOUNTER — OFFICE VISIT (OUTPATIENT)
Age: 69
End: 2024-01-22
Payer: MEDICARE

## 2024-01-22 ENCOUNTER — TELEPHONE (OUTPATIENT)
Age: 69
End: 2024-01-22

## 2024-01-22 VITALS
OXYGEN SATURATION: 98 % | TEMPERATURE: 97.8 F | HEART RATE: 66 BPM | HEIGHT: 70 IN | BODY MASS INDEX: 34.65 KG/M2 | WEIGHT: 242 LBS

## 2024-01-22 DIAGNOSIS — K64.2 GRADE III HEMORRHOIDS: ICD-10-CM

## 2024-01-22 DIAGNOSIS — K64.9 BLEEDING HEMORRHOIDS: Primary | ICD-10-CM

## 2024-01-22 PROCEDURE — 99203 OFFICE O/P NEW LOW 30 MIN: CPT | Performed by: COLON & RECTAL SURGERY

## 2024-01-22 NOTE — TELEPHONE ENCOUNTER
Called and spoke with patient.     Surgery scheduled 3/11/24 @ [facility]   Paperwork mailed to patient.

## 2024-01-22 NOTE — LETTER
Fausto Burk  [address] [facility] [address]        1/22/2024    Dear Fausto Burk,    Your surgery is scheduled for: March 11, 2024  The hospital will call the evening prior to your surgery with your expected arrival time.   Location: [address] [address]    CHECK LIST PRIOR TO OUTPATIENT SURGERY  It is your responsibility to obtain any/all referrals needed for your surgery if required by your insurance. Our office will contact you to discuss your insurance coverage for this procedure.     Special instructions required if you are taking any blood thinners. Please verify with the prescribing physician. Examples include Coumadin, Plavix, Xarelto, Eliquis, Pradaxa, etc.     Please check with your family physician if you are taking the following medications: Aspirin or any Aspirin containing medication, Gingko biloba, Ginseng, Feverfew, and/or Colusa’s Wort. We suggest stopping these for 3 days.     The night before and the day of your surgery, wash from your neck to groin with chlorhexidine soap. This soap is available at most retail pharmacies under such brand names as Hibiclens, Endure or Aplicare.     Pre-admission testing Required: YES      NO x     NOTHING TO EAT OR DRINK AFTER MIDNIGHT PRIOR TO SURGERY.     Take ONE FLEET ENEMA one hour prior to leaving for the hospital.     Please do not hesitate to call our office with any questions regarding your surgery.                              Post-Operative Care Information   Hemorrhoidectomy, EUA, Fissurectomy, Fistulotomy, Sphincterotomy      Resume high fiber diet. Fiber supplementation with Metamucil or Konsyl also recommended daily.       Your first bowel movement may take up to 3 days after surgery. THIS IS OFTEN PAINFUL.      While taking narcotic pain medication, you should remain on an over-the-counter stool softener such as Colace (one tablet twice daily). For constipation Miralax can be taken up to three  times daily.     Pain is to be expected after hemorrhoid surgery. Ibuprofen (400? 600MG) every 6?8 hours is an excellent alternative in addition or as a substitute to your narcotic pain medication.     Rectal bleeding or drainage is normal after surgery.       Nausea is a common complaint post op. This can be associated with the narcotic pain medications, anesthesia as well as with severe constipation.     Driving may be resumed when all off all narcotic pain medications and you can turn or twist your body without hesitation.    If you are unable to urinate within 8 hours after surgery, please call the office at [phone]    Frequent warm tub soaks or warm showers are often soothing, we suggest 3 to 4 times daily.    After bowel movements, you may wash with a hand shower or warm tub soak.  No soap is okay.     No strenuous activity (i.e., Running, jogging, swimming, or weightlifting) for up to one week after surgery.     When will I receive follow-up care?      You should be scheduled for a post-op appointment within 6-8 weeks after surgery, unless otherwise instructed on your discharge summary. If you do not have an existing appointment at the time of discharge, please call our office at [phone].    Call the office at [phone] immediately if you have a fever, chills, excessive sweating, difficulty urinating, or excessive/profuse bleeding with clots.

## 2024-01-22 NOTE — ASSESSMENT & PLAN NOTE
The patient has very large and prolapsing internal and external hemorrhoids.  He has bleeding and discomfort that has been present for many years.    I offered medical management versus surgery.  He really wants to have an operation done to minimize the hemorrhoidal problem.  His hemorrhoids are quite large and I think surgery is reasonable.  Risks, not limited to infection, bleeding, pain, persistent disease, need for future surgery, fecal incontinence, urinary retention, or nonhealing wounds were reviewed at length.  Questions were answered.  We will proceed with hemorrhoidectomy.

## 2024-03-04 NOTE — PRE-PROCEDURE INSTRUCTIONS
Pre-Surgery Instructions:   Medication Instructions    lisinopril (ZESTRIL) 20 mg tablet Take night before surgery    rosuvastatin (CRESTOR) 5 mg tablet Take night before surgery    sildenafil (VIAGRA) 100 mg tablet Uses PRN- DO NOT take day of surgery    Medication instructions for day surgery reviewed. Please use only a sip of water to take your instructed medications. Avoid all over the counter vitamins, supplements and NSAIDS for one week prior to surgery per anesthesia guidelines. Tylenol is ok to take as needed.     You will receive a call one business day prior to surgery with an arrival time and hospital directions. If your surgery is scheduled on a Monday, the hospital will be calling you on the Friday prior to your surgery. If you have not heard from anyone by 8pm, please call the hospital supervisor through the hospital  at [phone]. (Watson [phone] or Elaine [phone]).    Do not eat or drink anything after midnight the night before your surgery, including candy, mints, lifesavers, or chewing gum. Do not drink alcohol 24hrs before your surgery. Try not to smoke at least 24hrs before your surgery.       Follow the pre surgery showering instructions as listed in the “My Surgical Experience Booklet” or otherwise provided by your surgeon's office. Do not use a blade to shave the surgical area 1 week before surgery. It is okay to use a clean electric clippers up to 24 hours before surgery. Do not apply any lotions, creams, including makeup, cologne, deodorant, or perfumes after showering on the day of your surgery. Do not use dry shampoo, hair spray, hair gel, or any type of hair products.     No contact lenses, eye make-up, or artificial eyelashes. Remove nail polish, including gel polish, and any artificial, gel, or acrylic nails if possible. Remove all jewelry including rings and body piercing jewelry.     Wear causal clothing that is easy to take on and off. Consider your type of  surgery.    Keep any valuables, jewelry, piercings at home. Please bring any specially ordered equipment (sling, braces) if indicated.    Arrange for a responsible person to drive you to and from the hospital on the day of your surgery. Please confirm the visitor policy for the day of your procedure when you receive your phone call with an arrival time.     Call the surgeon's office with any new illnesses, exposures, or additional questions prior to surgery.    Please reference your “My Surgical Experience Booklet” for additional information to prepare for your upcoming surgery.

## 2024-03-11 ENCOUNTER — ANESTHESIA EVENT (OUTPATIENT)
Dept: PERIOP | Facility: HOSPITAL | Age: 69
End: 2024-03-11
Payer: MEDICARE

## 2024-03-11 ENCOUNTER — HOSPITAL ENCOUNTER (OUTPATIENT)
Facility: HOSPITAL | Age: 69
Setting detail: OUTPATIENT SURGERY
Discharge: HOME | End: 2024-03-11
Attending: COLON & RECTAL SURGERY | Admitting: COLON & RECTAL SURGERY
Payer: MEDICARE

## 2024-03-11 ENCOUNTER — ANESTHESIA (OUTPATIENT)
Dept: PERIOP | Facility: HOSPITAL | Age: 69
End: 2024-03-11
Payer: MEDICARE

## 2024-03-11 VITALS
HEIGHT: 70 IN | HEART RATE: 68 BPM | OXYGEN SATURATION: 94 % | WEIGHT: 237 LBS | BODY MASS INDEX: 33.93 KG/M2 | RESPIRATION RATE: 16 BRPM | DIASTOLIC BLOOD PRESSURE: 64 MMHG | SYSTOLIC BLOOD PRESSURE: 131 MMHG | TEMPERATURE: 96 F

## 2024-03-11 DIAGNOSIS — K64.9 BLEEDING HEMORRHOIDS: ICD-10-CM

## 2024-03-11 LAB — GLUCOSE SERPL-MCNC: 90 MG/DL (ref 65–140)

## 2024-03-11 PROCEDURE — 82948 REAGENT STRIP/BLOOD GLUCOSE: CPT

## 2024-03-11 PROCEDURE — 46260 REMOVE IN/EX HEM GROUPS 2+: CPT | Performed by: COLON & RECTAL SURGERY

## 2024-03-11 PROCEDURE — 88304 TISSUE EXAM BY PATHOLOGIST: CPT | Performed by: PATHOLOGY

## 2024-03-11 PROCEDURE — NC001 PR NO CHARGE: Performed by: COLON & RECTAL SURGERY

## 2024-03-11 RX ORDER — BUPIVACAINE HYDROCHLORIDE AND EPINEPHRINE 2.5; 5 MG/ML; UG/ML
INJECTION, SOLUTION EPIDURAL; INFILTRATION; INTRACAUDAL; PERINEURAL AS NEEDED
Status: DISCONTINUED | OUTPATIENT
Start: 2024-03-11 | End: 2024-03-11 | Stop reason: HOSPADM

## 2024-03-11 RX ORDER — PROPOFOL 10 MG/ML
INJECTION, EMULSION INTRAVENOUS AS NEEDED
Status: DISCONTINUED | OUTPATIENT
Start: 2024-03-11 | End: 2024-03-11

## 2024-03-11 RX ORDER — MIDAZOLAM HYDROCHLORIDE 2 MG/2ML
INJECTION, SOLUTION INTRAMUSCULAR; INTRAVENOUS AS NEEDED
Status: DISCONTINUED | OUTPATIENT
Start: 2024-03-11 | End: 2024-03-11

## 2024-03-11 RX ORDER — HYDROMORPHONE HCL/PF 1 MG/ML
0.5 SYRINGE (ML) INJECTION
Status: DISCONTINUED | OUTPATIENT
Start: 2024-03-11 | End: 2024-03-11 | Stop reason: HOSPADM

## 2024-03-11 RX ORDER — SODIUM CHLORIDE, SODIUM LACTATE, POTASSIUM CHLORIDE, CALCIUM CHLORIDE 600; 310; 30; 20 MG/100ML; MG/100ML; MG/100ML; MG/100ML
INJECTION, SOLUTION INTRAVENOUS CONTINUOUS PRN
Status: DISCONTINUED | OUTPATIENT
Start: 2024-03-11 | End: 2024-03-11

## 2024-03-11 RX ORDER — ONDANSETRON 2 MG/ML
4 INJECTION INTRAMUSCULAR; INTRAVENOUS ONCE AS NEEDED
Status: DISCONTINUED | OUTPATIENT
Start: 2024-03-11 | End: 2024-03-11 | Stop reason: HOSPADM

## 2024-03-11 RX ORDER — FENTANYL CITRATE/PF 50 MCG/ML
50 SYRINGE (ML) INJECTION
Status: DISCONTINUED | OUTPATIENT
Start: 2024-03-11 | End: 2024-03-11 | Stop reason: HOSPADM

## 2024-03-11 RX ORDER — OXYCODONE HYDROCHLORIDE AND ACETAMINOPHEN 5; 325 MG/1; MG/1
1 TABLET ORAL EVERY 4 HOURS PRN
Qty: 20 TABLET | Refills: 0 | Status: SHIPPED | OUTPATIENT
Start: 2024-03-11 | End: 2024-03-16

## 2024-03-11 RX ORDER — ONDANSETRON 2 MG/ML
INJECTION INTRAMUSCULAR; INTRAVENOUS AS NEEDED
Status: DISCONTINUED | OUTPATIENT
Start: 2024-03-11 | End: 2024-03-11

## 2024-03-11 RX ORDER — FENTANYL CITRATE 50 UG/ML
INJECTION, SOLUTION INTRAMUSCULAR; INTRAVENOUS AS NEEDED
Status: DISCONTINUED | OUTPATIENT
Start: 2024-03-11 | End: 2024-03-11

## 2024-03-11 RX ORDER — OXYCODONE HYDROCHLORIDE AND ACETAMINOPHEN 5; 325 MG/1; MG/1
1 TABLET ORAL ONCE AS NEEDED
Status: CANCELLED | OUTPATIENT
Start: 2024-03-11

## 2024-03-11 RX ORDER — LIDOCAINE HYDROCHLORIDE 10 MG/ML
INJECTION, SOLUTION EPIDURAL; INFILTRATION; INTRACAUDAL; PERINEURAL AS NEEDED
Status: DISCONTINUED | OUTPATIENT
Start: 2024-03-11 | End: 2024-03-11

## 2024-03-11 RX ADMIN — SODIUM CHLORIDE, SODIUM LACTATE, POTASSIUM CHLORIDE, AND CALCIUM CHLORIDE: .6; .31; .03; .02 INJECTION, SOLUTION INTRAVENOUS at 14:55

## 2024-03-11 RX ADMIN — SODIUM CHLORIDE 8 MCG: 9 INJECTION, SOLUTION INTRAVENOUS at 14:59

## 2024-03-11 RX ADMIN — LIDOCAINE HYDROCHLORIDE 50 MG: 10 INJECTION, SOLUTION EPIDURAL; INFILTRATION; INTRACAUDAL; PERINEURAL at 14:59

## 2024-03-11 RX ADMIN — ONDANSETRON 4 MG: 2 INJECTION INTRAMUSCULAR; INTRAVENOUS at 14:59

## 2024-03-11 RX ADMIN — MIDAZOLAM 2 MG: 1 INJECTION INTRAMUSCULAR; INTRAVENOUS at 14:55

## 2024-03-11 RX ADMIN — PROPOFOL 50 MG: 10 INJECTION, EMULSION INTRAVENOUS at 14:59

## 2024-03-11 RX ADMIN — FENTANYL CITRATE 50 MCG: 50 INJECTION INTRAMUSCULAR; INTRAVENOUS at 15:03

## 2024-03-11 RX ADMIN — PROPOFOL 120 MCG/KG/MIN: 10 INJECTION, EMULSION INTRAVENOUS at 15:06

## 2024-03-11 RX ADMIN — FENTANYL CITRATE 50 MCG: 50 INJECTION INTRAMUSCULAR; INTRAVENOUS at 14:59

## 2024-03-11 NOTE — ANESTHESIA POSTPROCEDURE EVALUATION
Post-Op Assessment Note    CV Status:  Stable    Pain management: satisfactory to patient       Mental Status:  Alert and awake   Hydration Status:  Euvolemic   PONV Controlled:  Controlled   Airway Patency:  Patent  Two or more mitigation strategies used for obstructive sleep apnea   Post Op Vitals Reviewed: Yes    No anethesia notable event occurred.    Staff: CRNA               BP   107/57   Temp   97.5   Pulse  65   Resp   18   SpO2   95

## 2024-03-11 NOTE — OP NOTE
OPERATIVE REPORT  PATIENT NAME: Fausto Burk    :  1955  MRN: [id number]  Pt Location: [facility] OR ROOM 02    SURGERY DATE: 3/11/2024    Surgeons and Role:     * FELIPE Swenson MD - Primary    Preop Diagnosis:  Bleeding hemorrhoids [K64.9]    Post-Op Diagnosis Codes:     * Bleeding hemorrhoids [K64.9]    Procedure(s):  HEMORRHOIDECTOMY EXCISION x 3    Specimen(s):  ID Type Source Tests Collected by Time Destination   1 : R/O CONDYLOMA Tissue Hemorrhoids TISSUE EXAM W Shravan Swenson MD 3/11/2024  3:18 PM        Estimated Blood Loss:   20 mL    Drains:  * No LDAs found *    Anesthesia Type:   General    Operative Indications:  Bleeding hemorrhoids [K64.9]    Operative Findings:  Severe scarring of external hemorrhoids with redundant internal hemorrhoids.  Scarring extends into the internal hemorrhoids.    Complications:   None    Procedure and Technique:  The patient was placed in a prone jackknife position with the buttocks taped apart.  The anal area was prepped using Betadine and draped in a sterile manner.  A timeout was done.    Inspection revealed very large, nodular, external hemorrhoids with an appearance that could be consistent with condyloma formation but this is uncertain.  These not be hemorrhoids extended circumferentially and made it difficult to see inside the anus during anoscopy.  Digital exam internally revealed some thickening of the internal hemorrhoids but no other findings.    I felt that the best option for him was hemorrhoidectomy.  This would reduce the large and bulky external hemorrhoids along with some internal hemorrhoidal tissue as well as allow for improvement in the symptoms of irritation and difficulty with hygiene.    The right anterior, right posterior, and left lateral positions were anesthetized with quarter percent Marcaine with epinephrine.  A total of 45 mL was used.  Some of this was injected before the hemorrhoidectomies and some at the end of the operation.  A  Hill-Ramirez retractor was placed at each position sequentially and the hemorrhoids were treated similarly.  The hemorrhoids were grasped at the anal verge and withdrawn caudally.  Scissors technique was used to excise beneath the subcutaneous tissue from 1 cm to 1.5 cm caudal to the anal verge and the excision was carried proximally to the proximal extent of the internal hemorrhoids.  I tried to leave some adventitia on the musculature of the anus.  There was scarring and tacking of the hemorrhoidal tissue to the submucosa and anus, making identification of planes somewhat difficult but the muscle was not involved in our excision.  The internal sphincter could be seen to be intact at all sites.  Once the excision was debrided, the wound was closed using a running 2-0 chromic suture.  Each site was inspected for hemostasis at the end of the operation and was seen to be dry.    Sponge needle and instrument counts were correct.  The appearance of the anus suggested that no further therapy should be given at this time.  There was some redundancy of the anterior midline external hemorrhoid but I felt that further excision at this time may result in difficulty with healing due to tension and I did not perform any further therapy.  This can be managed in the future if necessary.    I was present for the entire procedure.    Patient Disposition:  PACU         SIGNATURE: GORDON Swenson MD  DATE: March 11, 2024  TIME: 4:00 PM

## 2024-03-11 NOTE — H&P
"History and Physical   Colon and Rectal Surgery   Fausto Burk 68 y.o. male MRN: [id number]  Unit/Bed#: OR [place] Encounter: [id number]  03/11/24   3:05 PM      CC:  Hemorrhoids    History of Present Illness   HPI:  Fausto Burk is a 68 y.o. male for hemorrhoidectomy.  Historical Information   Past Medical History:   Diagnosis Date    Hypertension 2/14/2023    Obesity 2/14/2023     History reviewed. No pertinent surgical history.    Meds/Allergies     Medications Prior to Admission   Medication    lisinopril (ZESTRIL) 20 mg tablet    rosuvastatin (CRESTOR) 5 mg tablet    sildenafil (VIAGRA) 100 mg tablet       No current facility-administered medications for this encounter.    No Known Allergies      Social History   Social History     Substance and Sexual Activity   Alcohol Use Yes    Comment: social     Social History     Substance and Sexual Activity   Drug Use Never     Social History     Tobacco Use   Smoking Status Former    Types: Cigarettes    Passive exposure: Past   Smokeless Tobacco Never         Family History:   Family History   Problem Relation Age of Onset    Dementia Mother     Heart disease Mother     Stroke Father     Heart disease Father     Hearing loss Father          Objective     Current Vitals:   Blood Pressure: (!) 183/84 (03/11/24 1322)  Pulse: 61 (03/11/24 1322)  Temperature: (!) 96.9 °F (36.1 °C) (03/11/24 1322)  Temp Source: Tympanic (03/11/24 1322)  Respirations: 18 (03/11/24 1322)  Height: 5' 10\" (177.8 cm) (03/11/24 1322)  Weight - Scale: 108 kg (237 lb) (03/11/24 1322)  SpO2: 96 % (03/11/24 1322)  No intake or output data in the 24 hours ending 03/11/24 1505    Physical Exam:  General:  Well nourished, no distress.  Neuro: Alert and oriented  Eyes:Sclera anicteric, conjunctiva pink.  Pulm: Clear to auscultation bilaterally. No respiratory Distress.   CV:  Regular rate and rhythm. No murmurs.  Abdomen:  Soft, flat, non-tender, without masses or hepatosplenomegaly.    Lab Results: "       ASSESSMENT:  Fausto Burk is a 68 y.o. male for hemorrhoidectomy.  PLAN:  Colonoscopy.  Risks , including, but not limited to, bleeding, perforation, missed lesions, and potential need for surgery, were reviewed. Alternatives to colonoscopy were discussed.  GORDON Swenson MD

## 2024-03-13 PROCEDURE — 88304 TISSUE EXAM BY PATHOLOGIST: CPT | Performed by: PATHOLOGY

## 2024-04-18 NOTE — PROGRESS NOTES
Colon and Rectal Surgery   Fausto Burk 68 y.o. male MRN [id number]  Encounter: [id number]  04/22/24 9:31 AM            Assessment: Fausto Burk is a 68 y.o. male who had hemorrhoidectomy      Plan:   Bleeding hemorrhoids  Doing very well after hemorrhoidectomy.  His bowel movements are regular and he has no difficulties.  Exam shows well-healing hemorrhoidectomy wounds.    He can return as needed.      Subjective     HPI    Fausto Burk is a 68 y.o. male who is here for a post operative evaluation.     The patient is status post hemorrhoidectomy excision x3 on 3/11/2024.    Surgical pathology:  A. Hemorrhoids, R/O CONDYLOMA, ; excision:   -Anorectal mucosa with large ectatic and congested submucosal vessels consistent with hemorrhoids with surface ulceration   -Fragment of anal tag/ fibroepithelial polyp  -Negative for malignancy         Historical Information   Past Medical History:   Diagnosis Date    Hypertension 2/14/2023    Obesity 2/14/2023     Past Surgical History:   Procedure Laterality Date    RI EXCISION MULTIPLE EXTERNAL PAPILLAE/TAGS ANUS N/A 3/11/2024    Procedure: HEMORRHOIDECTOMY EXCISION;  Surgeon: FELIPE Swenson MD;  Location: [facility];  Service: Colorectal       Meds/Allergies       Current Outpatient Medications:     lisinopril (ZESTRIL) 20 mg tablet, Take 1 tablet (20 mg total) by mouth daily, Disp: 90 tablet, Rfl: 3    rosuvastatin (CRESTOR) 5 mg tablet, Take 1 tablet (5 mg total) by mouth daily, Disp: 90 tablet, Rfl: 3    sildenafil (VIAGRA) 100 mg tablet, Take 1 tablet (100 mg total) by mouth daily as needed for erectile dysfunction, Disp: 10 tablet, Rfl: 0  No Known Allergies    Social History   Social History     Substance and Sexual Activity   Drug Use Never     Social History     Tobacco Use   Smoking Status Former    Types: Cigarettes    Passive exposure: Past   Smokeless Tobacco Never         Family History   Problem Relation Age of Onset    Dementia Mother     Heart disease  "Mother     Stroke Father     Heart disease Father     Hearing loss Father          Review of Systems    Objective   Current Vitals:  Vitals:    04/22/24 0919   BP: 146/80   BP Location: Left arm   Patient Position: Sitting   Cuff Size: Standard   Pulse: 94   Temp: 98.1 °F (36.7 °C)   TempSrc: Temporal   SpO2: 97%   Weight: 109 kg (241 lb)   Height: 5' 10\" (1.778 m)         Physical Exam            "

## 2024-04-22 ENCOUNTER — OFFICE VISIT (OUTPATIENT)
Age: 69
End: 2024-04-22

## 2024-04-22 VITALS
BODY MASS INDEX: 34.5 KG/M2 | HEART RATE: 94 BPM | DIASTOLIC BLOOD PRESSURE: 80 MMHG | SYSTOLIC BLOOD PRESSURE: 146 MMHG | HEIGHT: 70 IN | WEIGHT: 241 LBS | OXYGEN SATURATION: 97 % | TEMPERATURE: 98.1 F

## 2024-04-22 DIAGNOSIS — K64.9 BLEEDING HEMORRHOIDS: Primary | ICD-10-CM

## 2024-04-22 PROCEDURE — 99024 POSTOP FOLLOW-UP VISIT: CPT | Performed by: COLON & RECTAL SURGERY

## 2024-04-22 NOTE — ASSESSMENT & PLAN NOTE
Doing very well after hemorrhoidectomy.  His bowel movements are regular and he has no difficulties.  Exam shows well-healing hemorrhoidectomy wounds.    He can return as needed.

## 2024-05-22 ENCOUNTER — LAB (OUTPATIENT)
Dept: LAB | Facility: CLINIC | Age: 69
End: 2024-05-22
Payer: MEDICARE

## 2024-05-22 DIAGNOSIS — Z13.220 SCREENING FOR HYPERCHOLESTEROLEMIA: ICD-10-CM

## 2024-05-22 DIAGNOSIS — I10 PRIMARY HYPERTENSION: ICD-10-CM

## 2024-05-22 DIAGNOSIS — Z13.1 SCREENING FOR DIABETES MELLITUS: ICD-10-CM

## 2024-05-22 DIAGNOSIS — Z13.0 SCREENING, ANEMIA, DEFICIENCY, IRON: ICD-10-CM

## 2024-05-22 DIAGNOSIS — Z12.5 SCREENING PSA (PROSTATE SPECIFIC ANTIGEN): ICD-10-CM

## 2024-05-22 PROCEDURE — 80061 LIPID PANEL: CPT

## 2024-05-22 PROCEDURE — 80053 COMPREHEN METABOLIC PANEL: CPT

## 2024-05-22 PROCEDURE — 85025 COMPLETE CBC W/AUTO DIFF WBC: CPT

## 2024-05-22 PROCEDURE — G0103 PSA SCREENING: HCPCS

## 2024-05-22 PROCEDURE — 82570 ASSAY OF URINE CREATININE: CPT

## 2024-05-22 PROCEDURE — 82043 UR ALBUMIN QUANTITATIVE: CPT

## 2024-05-22 PROCEDURE — 36415 COLL VENOUS BLD VENIPUNCTURE: CPT

## 2024-05-23 LAB
ALBUMIN SERPL BCP-MCNC: 4.3 G/DL (ref 3.5–5)
ALP SERPL-CCNC: 49 U/L (ref 34–104)
ALT SERPL W P-5'-P-CCNC: 18 U/L (ref 7–52)
ANION GAP SERPL CALCULATED.3IONS-SCNC: 10 MMOL/L (ref 4–13)
AST SERPL W P-5'-P-CCNC: 25 U/L (ref 13–39)
BASOPHILS # BLD AUTO: 0.04 THOUSANDS/ÂΜL (ref 0–0.1)
BASOPHILS NFR BLD AUTO: 0 % (ref 0–1)
BILIRUB SERPL-MCNC: 0.8 MG/DL (ref 0.2–1)
BUN SERPL-MCNC: 18 MG/DL (ref 5–25)
CALCIUM SERPL-MCNC: 9.1 MG/DL (ref 8.4–10.2)
CHLORIDE SERPL-SCNC: 101 MMOL/L (ref 96–108)
CHOLEST SERPL-MCNC: 188 MG/DL
CO2 SERPL-SCNC: 26 MMOL/L (ref 21–32)
CREAT SERPL-MCNC: 0.91 MG/DL (ref 0.6–1.3)
CREAT UR-MCNC: 122.5 MG/DL
EOSINOPHIL # BLD AUTO: 0.26 THOUSAND/ÂΜL (ref 0–0.61)
EOSINOPHIL NFR BLD AUTO: 3 % (ref 0–6)
ERYTHROCYTE [DISTWIDTH] IN BLOOD BY AUTOMATED COUNT: 12.6 % (ref 11.6–15.1)
GFR SERPL CREATININE-BSD FRML MDRD: 86 ML/MIN/1.73SQ M
GLUCOSE P FAST SERPL-MCNC: 90 MG/DL (ref 65–99)
HCT VFR BLD AUTO: 40.7 % (ref 36.5–49.3)
HDLC SERPL-MCNC: 61 MG/DL
HGB BLD-MCNC: 13.7 G/DL (ref 12–17)
IMM GRANULOCYTES # BLD AUTO: 0.03 THOUSAND/UL (ref 0–0.2)
IMM GRANULOCYTES NFR BLD AUTO: 0 % (ref 0–2)
LDLC SERPL CALC-MCNC: 117 MG/DL (ref 0–100)
LYMPHOCYTES # BLD AUTO: 1.74 THOUSANDS/ÂΜL (ref 0.6–4.47)
LYMPHOCYTES NFR BLD AUTO: 19 % (ref 14–44)
MCH RBC QN AUTO: 29.7 PG (ref 26.8–34.3)
MCHC RBC AUTO-ENTMCNC: 33.7 G/DL (ref 31.4–37.4)
MCV RBC AUTO: 88 FL (ref 82–98)
MICROALBUMIN UR-MCNC: 9.3 MG/L
MICROALBUMIN/CREAT 24H UR: 8 MG/G CREATININE (ref 0–30)
MONOCYTES # BLD AUTO: 0.81 THOUSAND/ÂΜL (ref 0.17–1.22)
MONOCYTES NFR BLD AUTO: 9 % (ref 4–12)
NEUTROPHILS # BLD AUTO: 6.27 THOUSANDS/ÂΜL (ref 1.85–7.62)
NEUTS SEG NFR BLD AUTO: 69 % (ref 43–75)
NRBC BLD AUTO-RTO: 0 /100 WBCS
PLATELET # BLD AUTO: 204 THOUSANDS/UL (ref 149–390)
PMV BLD AUTO: 11.2 FL (ref 8.9–12.7)
POTASSIUM SERPL-SCNC: 4.1 MMOL/L (ref 3.5–5.3)
PROT SERPL-MCNC: 6.5 G/DL (ref 6.4–8.4)
PSA SERPL-MCNC: 1.52 NG/ML (ref 0–4)
RBC # BLD AUTO: 4.61 MILLION/UL (ref 3.88–5.62)
SODIUM SERPL-SCNC: 137 MMOL/L (ref 135–147)
TRIGL SERPL-MCNC: 52 MG/DL
WBC # BLD AUTO: 9.15 THOUSAND/UL (ref 4.31–10.16)

## 2024-05-24 ENCOUNTER — OFFICE VISIT (OUTPATIENT)
Dept: INTERNAL MEDICINE CLINIC | Facility: CLINIC | Age: 69
End: 2024-05-24
Payer: MEDICARE

## 2024-05-24 VITALS
BODY MASS INDEX: 35.03 KG/M2 | SYSTOLIC BLOOD PRESSURE: 150 MMHG | WEIGHT: 236.5 LBS | DIASTOLIC BLOOD PRESSURE: 82 MMHG | TEMPERATURE: 97.9 F | HEART RATE: 60 BPM | OXYGEN SATURATION: 97 % | HEIGHT: 69 IN

## 2024-05-24 DIAGNOSIS — E78.00 PURE HYPERCHOLESTEROLEMIA: ICD-10-CM

## 2024-05-24 DIAGNOSIS — Z11.59 NEED FOR HEPATITIS C SCREENING TEST: ICD-10-CM

## 2024-05-24 DIAGNOSIS — Z12.11 SCREENING FOR COLON CANCER: ICD-10-CM

## 2024-05-24 DIAGNOSIS — M16.11 PRIMARY OSTEOARTHRITIS OF RIGHT HIP: ICD-10-CM

## 2024-05-24 DIAGNOSIS — Z13.6 SCREENING FOR AAA (ABDOMINAL AORTIC ANEURYSM): ICD-10-CM

## 2024-05-24 DIAGNOSIS — I10 PRIMARY HYPERTENSION: Primary | ICD-10-CM

## 2024-05-24 PROCEDURE — G0439 PPPS, SUBSEQ VISIT: HCPCS | Performed by: INTERNAL MEDICINE

## 2024-05-24 PROCEDURE — 99214 OFFICE O/P EST MOD 30 MIN: CPT | Performed by: INTERNAL MEDICINE

## 2024-05-24 RX ORDER — MELOXICAM 15 MG/1
15 TABLET ORAL DAILY PRN
Qty: 14 TABLET | Refills: 0 | Status: SHIPPED | OUTPATIENT
Start: 2024-05-24 | End: 2024-06-23

## 2024-05-24 RX ORDER — ROSUVASTATIN CALCIUM 10 MG/1
10 TABLET, COATED ORAL DAILY
Qty: 90 TABLET | Refills: 3 | Status: SHIPPED | OUTPATIENT
Start: 2024-05-24 | End: 2025-05-19

## 2024-05-24 RX ORDER — LISINOPRIL 30 MG/1
30 TABLET ORAL DAILY
Qty: 90 TABLET | Refills: 3 | Status: SHIPPED | OUTPATIENT
Start: 2024-05-24 | End: 2025-05-19

## 2024-05-24 NOTE — PATIENT INSTRUCTIONS
Medicare Preventive Visit Patient Instructions  Thank you for completing your Welcome to Medicare Visit or Medicare Annual Wellness Visit today. Your next wellness visit will be due in one year (5/25/2025).  The screening/preventive services that you may require over the next 5-10 years are detailed below. Some tests may not apply to you based off risk factors and/or age. Screening tests ordered at today's visit but not completed yet may show as past due. Also, please note that scanned in results may not display below.  Preventive Screenings:  Service Recommendations Previous Testing/Comments   Colorectal Cancer Screening  Colonoscopy    Fecal Occult Blood Test (FOBT)/Fecal Immunochemical Test (FIT)  Fecal DNA/Cologuard Test  Flexible Sigmoidoscopy Age: 45-75 years old   Colonoscopy: every 10 years (May be performed more frequently if at higher risk)  OR  FOBT/FIT: every 1 year  OR  Cologuard: every 3 years  OR  Sigmoidoscopy: every 5 years  Screening may be recommended earlier than age 45 if at higher risk for colorectal cancer. Also, an individualized decision between you and your healthcare provider will decide whether screening between the ages of 76-85 would be appropriate. Colonoscopy: Not on file  FOBT/FIT: Not on file  Cologuard: Not on file  Sigmoidoscopy: Not on file          Prostate Cancer Screening Individualized decision between patient and health care provider in men between ages of 55-69   Medicare will cover every 12 months beginning on the day after your 50th birthday PSA: 1.52 ng/mL     Screening Current     Hepatitis C Screening Once for adults born between 1945 and 1965  More frequently in patients at high risk for Hepatitis C Hep C Antibody: Not on file        Diabetes Screening 1-2 times per year if you're at risk for diabetes or have pre-diabetes Fasting glucose: 90 mg/dL (5/22/2024)  A1C: No results in last 5 years (No results in last 5 years)  Screening Current   Cholesterol Screening Once  every 5 years if you don't have a lipid disorder. May order more often based on risk factors. Lipid panel: 05/22/2024  Screening Not Indicated  History Lipid Disorder      Other Preventive Screenings Covered by Medicare:  Abdominal Aortic Aneurysm (AAA) Screening: covered once if your at risk. You're considered to be at risk if you have a family history of AAA or a male between the age of 65-75 who smoking at least 100 cigarettes in your lifetime.  Lung Cancer Screening: covers low dose CT scan once per year if you meet all of the following conditions: (1) Age 55-77; (2) No signs or symptoms of lung cancer; (3) Current smoker or have quit smoking within the last 15 years; (4) You have a tobacco smoking history of at least 20 pack years (packs per day x number of years you smoked); (5) You get a written order from a healthcare provider.  Glaucoma Screening: covered annually if you're considered high risk: (1) You have diabetes OR (2) Family history of glaucoma OR (3)  aged 50 and older OR (4)  American aged 65 and older  Osteoporosis Screening: covered every 2 years if you meet one of the following conditions: (1) Have a vertebral abnormality; (2) On glucocorticoid therapy for more than 3 months; (3) Have primary hyperparathyroidism; (4) On osteoporosis medications and need to assess response to drug therapy.  HIV Screening: covered annually if you're between the age of 15-65. Also covered annually if you are younger than 15 and older than 65 with risk factors for HIV infection. For pregnant patients, it is covered up to 3 times per pregnancy.    Immunizations:  Immunization Recommendations   Influenza Vaccine Annual influenza vaccination during flu season is recommended for all persons aged >= 6 months who do not have contraindications   Pneumococcal Vaccine   * Pneumococcal conjugate vaccine = PCV13 (Prevnar 13), PCV15 (Vaxneuvance), PCV20 (Prevnar 20)  * Pneumococcal polysaccharide vaccine  = PPSV23 (Pneumovax) Adults 19-63 yo with certain risk factors or if 65+ yo  If never received any pneumonia vaccine: recommend Prevnar 20 (PCV20)  Give PCV20 if previously received 1 dose of PCV13 or PPSV23   Hepatitis B Vaccine 3 dose series if at intermediate or high risk (ex: diabetes, end stage renal disease, liver disease)   Respiratory syncytial virus (RSV) Vaccine - COVERED BY MEDICARE PART D  * RSVPreF3 (Arexvy) CDC recommends that adults 60 years of age and older may receive a single dose of RSV vaccine using shared clinical decision-making (SCDM)   Tetanus (Td) Vaccine - COST NOT COVERED BY MEDICARE PART B Following completion of primary series, a booster dose should be given every 10 years to maintain immunity against tetanus. Td may also be given as tetanus wound prophylaxis.   Tdap Vaccine - COST NOT COVERED BY MEDICARE PART B Recommended at least once for all adults. For pregnant patients, recommended with each pregnancy.   Shingles Vaccine (Shingrix) - COST NOT COVERED BY MEDICARE PART B  2 shot series recommended in those 19 years and older who have or will have weakened immune systems or those 50 years and older     Health Maintenance Due:      Topic Date Due   • Hepatitis C Screening  Never done   • Colorectal Cancer Screening  Never done     Immunizations Due:  There are no preventive care reminders to display for this patient.  Advance Directives   What are advance directives?  Advance directives are legal documents that state your wishes and plans for medical care. These plans are made ahead of time in case you lose your ability to make decisions for yourself. Advance directives can apply to any medical decision, such as the treatments you want, and if you want to donate organs.   What are the types of advance directives?  There are many types of advance directives, and each state has rules about how to use them. You may choose a combination of any of the following:  Living will:  This is a  written record of the treatment you want. You can also choose which treatments you do not want, which to limit, and which to stop at a certain time. This includes surgery, medicine, IV fluid, and tube feedings.   Durable power of  for healthcare (DPAHC):  This is a written record that states who you want to make healthcare choices for you when you are unable to make them for yourself. This person, called a proxy, is usually a family member or a friend. You may choose more than 1 proxy.  Do not resuscitate (DNR) order:  A DNR order is used in case your heart stops beating or you stop breathing. It is a request not to have certain forms of treatment, such as CPR. A DNR order may be included in other types of advance directives.  Medical directive:  This covers the care that you want if you are in a coma, near death, or unable to make decisions for yourself. You can list the treatments you want for each condition. Treatment may include pain medicine, surgery, blood transfusions, dialysis, IV or tube feedings, and a ventilator (breathing machine).  Values history:  This document has questions about your views, beliefs, and how you feel and think about life. This information can help others choose the care that you would choose.  Why are advance directives important?  An advance directive helps you control your care. Although spoken wishes may be used, it is better to have your wishes written down. Spoken wishes can be misunderstood, or not followed. Treatments may be given even if you do not want them. An advance directive may make it easier for your family to make difficult choices about your care.   Weight Management   Why it is important to manage your weight:  Being overweight increases your risk of health conditions such as heart disease, high blood pressure, type 2 diabetes, and certain types of cancer. It can also increase your risk for osteoarthritis, sleep apnea, and other respiratory problems. Aim for  a slow, steady weight loss. Even a small amount of weight loss can lower your risk of health problems.  How to lose weight safely:  A safe and healthy way to lose weight is to eat fewer calories and get regular exercise. You can lose up about 1 pound a week by decreasing the number of calories you eat by 500 calories each day.   Healthy meal plan for weight management:  A healthy meal plan includes a variety of foods, contains fewer calories, and helps you stay healthy. A healthy meal plan includes the following:  Eat whole-grain foods more often.  A healthy meal plan should contain fiber. Fiber is the part of grains, fruits, and vegetables that is not broken down by your body. Whole-grain foods are healthy and provide extra fiber in your diet. Some examples of whole-grain foods are whole-wheat breads and pastas, oatmeal, brown rice, and bulgur.  Eat a variety of vegetables every day.  Include dark, leafy greens such as spinach, kale, simi greens, and mustard greens. Eat yellow and orange vegetables such as carrots, sweet potatoes, and winter squash.   Eat a variety of fruits every day.  Choose fresh or canned fruit (canned in its own juice or light syrup) instead of juice. Fruit juice has very little or no fiber.  Eat low-fat dairy foods.  Drink fat-free (skim) milk or 1% milk. Eat fat-free yogurt and low-fat cottage cheese. Try low-fat cheeses such as mozzarella and other reduced-fat cheeses.  Choose meat and other protein foods that are low in fat.  Choose beans or other legumes such as split peas or lentils. Choose fish, skinless poultry (chicken or turkey), or lean cuts of red meat (beef or pork). Before you cook meat or poultry, cut off any visible fat.   Use less fat and oil.  Try baking foods instead of frying them. Add less fat, such as margarine, sour cream, regular salad dressing and mayonnaise to foods. Eat fewer high-fat foods. Some examples of high-fat foods include french fries, doughnuts, ice  "cream, and cakes.  Eat fewer sweets.  Limit foods and drinks that are high in sugar. This includes candy, cookies, regular soda, and sweetened drinks.  Exercise:  Exercise at least 30 minutes per day on most days of the week. Some examples of exercise include walking, biking, dancing, and swimming. You can also fit in more physical activity by taking the stairs instead of the elevator or parking farther away from stores. Ask your healthcare provider about the best exercise plan for you.   Alcohol Use and Your Health    Drinking too much can harm your health.  Excessive alcohol use leads to about 88,000 death in the [place] each year, and shortens the life of those who diet by almost 30 years.  Further, excessive drinking cost the economy $249 billion in 2010.  Most excessive drinkers are not alcohol dependent.    Excessive alcohol use has immediate effects that increase the risk of many harmful health conditions.  These are most often the result of binge drinking.  Over time, excessive alcohol use can lead to the development of chronic diseases and other series health problems.    What is considered a \"drink\"?        Excessive alcohol use includes:  Binge Drinking: For women, 4 or more drinks consumed on one occasion. For men, 5 or more drinks consumed on one occasion.  Heavy Drinking: For women, 8 or more drinks per week. For men, 15 or more drinks per week  Any alcohol used by pregnant women  Any alcohol used by those under the age of 21 years    If you choose to drink, do so in moderation:  Do not drink at all if you are under the age of 21, or if you are or may be pregnant, or have health problems that could be made worse by drinking.  For women, up to 1 drink per day  For men, up to 2 drinks a day    No one should begin drinking or drink more frequently based on potential health benefits    Short-Term Health Risks:  Injuries: motor vehicle crashes, falls, drownings, burns  Violence: homicide, suicide, " sexual assault, intimate partner violence  Alcohol poisoning  Reproductive health: risky sexual behaviors, unintended prengnacy, sexually transmitted diseases, miscarriage, stillbirth, fetal alcohol syndrome    Long-Term Health Risks:  Chronic diseases: high blood pressure, heart disease, stroke, liver disease, digestive problems  Cancers: breast, mouth and throat, liver, colon  Learning and memory problems: dementia, poor school performance  Mental health: depression, anxiety, insomnia  Social problems: lost productivity, family problems, unemployment  Alcohol dependence    For support and more information:  Substance Abuse and Mental Health Services Administration  [address]  [place] [address]  Web Address: http://www.[facility].gov    Alcoholics Anonymous        Web Address: http://www.aa.org    https://www.cdc.gov/alcohol/fact-sheets/alcohol-use.htm     © Copyright [employer] 2018 Information is for End User's use only and may not be sold, redistributed or otherwise used for commercial purposes. All illustrations and images included in CareNotes® are the copyrighted property of [employer]M., Inc. or [employer]

## 2024-05-24 NOTE — PROGRESS NOTES
Ambulatory Visit  Name: Fausto Burk      : 1955      MRN: [id number]  Encounter Provider: Ramakrishan Lopez DO  Encounter Date: 2024   Encounter department: [facility]    Assessment & Plan   1. Primary hypertension  Assessment & Plan:  Increased the dose of Lisinopril to 30 mg Qday  Reviewed the patient's renal function and no concerns at this time.  Orders:  -     lisinopril (ZESTRIL) 30 mg tablet; Take 1 tablet (30 mg total) by mouth daily  2. Pure hypercholesterolemia  Assessment & Plan:  Reviewed the LDL cholesterol  LDL elevated  Increase the Rosuvastatin to 10 mg Qday  Orders:  -     rosuvastatin (CRESTOR) 10 MG tablet; Take 1 tablet (10 mg total) by mouth daily  3. Primary osteoarthritis of right hip  Assessment & Plan:  Mobic 15 mg One Tab Daily x 7 days then 15 mg PRN  Not interested in xray and deferred follow up with Ortho for now  Orders:  -     meloxicam (MOBIC) 15 mg tablet; Take 1 tablet (15 mg total) by mouth daily as needed (Muscular skeletal pain)  4. Need for hepatitis C screening test  5. Screening for colon cancer  -     Cologrivas  6. Screening for AAA (abdominal aortic aneurysm)  -     US abdominal aorta; Future; Expected date: 2024       Preventive health issues were discussed with patient, and age appropriate screening tests were ordered as noted in patient's After Visit Summary. Personalized health advice and appropriate referrals for health education or preventive services given if needed, as noted in patient's After Visit Summary.    History of Present Illness     Reviewed labs and noted that the patient LDL cholesterol is mildly elevated and we discussed increasing the dose of Rosuvastatin.  BP was also elevated and we discussed increasing the dose of Lisinopril.  The patient has latera and inguinal region pain.  He states that the pain is stiff and aching.  He notes no injury to the area, but does note regular exercise.  He notes RLS symptoms and is  not interested in treatment at this time.       Patient Care Team:  Ramakrishna Lopez DO as PCP - General  Missael Tavera MD    Review of Systems   Constitutional:  Negative for chills, fatigue and fever.   HENT: Negative.     Respiratory:  Negative for cough, chest tightness and shortness of breath.    Cardiovascular:  Negative for chest pain and palpitations.   Gastrointestinal:  Negative for abdominal pain, constipation, diarrhea, nausea and vomiting.   Genitourinary: Negative.    Musculoskeletal:  Positive for arthralgias. Negative for back pain and myalgias.   Skin: Negative.    Neurological: Negative.    Psychiatric/Behavioral:  Negative for dysphoric mood. The patient is not nervous/anxious.      Medical History Reviewed by provider this encounter:  Tobacco  Allergies  Meds  Problems  Med Hx  Surg Hx  Fam Hx       Annual Wellness Visit Questionnaire   Fausto is here for his Subsequent Wellness visit.     Health Risk Assessment:   Patient rates overall health as good. Patient feels that their physical health rating is slightly worse. Patient is very satisfied with their life. Eyesight was rated as same. Hearing was rated as same. Patient feels that their emotional and mental health rating is same. Patients states they are sometimes angry. Patient states they are sometimes unusually tired/fatigued. Pain experienced in the last 7 days has been some. Patient's pain rating has been 2/10. Patient states that he has experienced no weight loss or gain in last 6 months.     Depression Screening:   PHQ-2 Score: 0      Fall Risk Screening:   In the past year, patient has experienced: no history of falling in past year      Home Safety:  Patient does not have trouble with stairs inside or outside of their home. Patient has working smoke alarms and has working carbon monoxide detector. Home safety hazards include: none.     Nutrition:   Current diet is Unhealthy and Frequent junk food.     Medications:    Patient is not currently taking any over-the-counter supplements. Patient is able to manage medications.     Activities of Daily Living (ADLs)/Instrumental Activities of Daily Living (IADLs):   Walk and transfer into and out of bed and chair?: Yes  Dress and groom yourself?: Yes    Bathe or shower yourself?: Yes    Feed yourself? Yes  Do your laundry/housekeeping?: Yes  Manage your money, pay your bills and track your expenses?: Yes  Make your own meals?: Yes    Do your own shopping?: Yes    Durable Medical Equipment Suppliers  None    Previous Hospitalizations:   Any hospitalizations or ED visits within the last 12 months?: No      Advance Care Planning:   Living will: Yes    Durable POA for healthcare: Yes    Advanced directive: Yes    Advanced directive counseling given: No    Five wishes given: No    Patient declined ACP directive: No    End of Life Decisions reviewed with patient: No    Provider agrees with end of life decisions: Yes      Cognitive Screening:   Provider or family/friend/caregiver concerned regarding cognition?: No    PREVENTIVE SCREENINGS      Cardiovascular Screening:    General: Screening Not Indicated and History Lipid Disorder      Diabetes Screening:     General: Screening Current      Colorectal Cancer Screening:       Due for: Cologuard      Prostate Cancer Screening:    General: Screening Current      Osteoporosis Screening:    General: Screening Not Indicated      Abdominal Aortic Aneurysm (AAA) Screening:    Risk factors include: age between 65-76 yo and tobacco use      Due for: Screening AAA Ultrasound      Lung Cancer Screening:     General: Screening Not Indicated    Screening, Brief Intervention, and Referral to Treatment (SBIRT)    Screening  Typical number of drinks in a day: 2  Typical number of drinks in a week: 14  Interpretation: Low risk drinking behavior.    AUDIT-C Screenin) How often did you have a drink containing alcohol in the past year? 4 or more times a  week  2) How many drinks did you have on a typical day when you were drinking in the past year? 1 to 2  3) How often did you have 6 or more drinks on one occasion in the past year? less than monthly    AUDIT-C Score: 5  Interpretation: Score 4-12 (male): POSITIVE screen for alcohol misuse    AUDIT Screenin) How often during the last year have you found that you were not able to stop drinking once you had started? 0 - never  5) How often during the last year have you failed to do what was normally expected from you because of drinking? 0 - never  6) How often during the last year have you needed a first drink in the morning to get yourself going after a heavy drinking session? 0 - never  7) How often during the last year have you had a feeling of guilt or remorse after drinking? 0 - never  8) How often during the last year have you been unable to remember what happened the night before because you had been drinking? 0 - never  9) Have you or someone else been injured as a result of your drinking? 0 - no  10) Has a relative or friend or a doctor or another health worker been concerned about your drinking or suggested you cut down? 0 - no    AUDIT Score: 5  Interpretation: Low risk alcohol consumption    Single Item Drug Screening:  How often have you used an illegal drug (including marijuana) or a prescription medication for non-medical reasons in the past year? never    Single Item Drug Screen Score: 0  Interpretation: Negative screen for possible drug use disorder    Social Determinants of Health     Financial Resource Strain: Low Risk  (2023)    Overall Financial Resource Strain ([facility])    • Difficulty of Paying Living Expenses: Not hard at all   Food Insecurity: No Food Insecurity (2024)    Hunger Vital Sign    • Worried About Running Out of Food in the Last Year: Never true    • Ran Out of Food in the Last Year: Never true   Transportation Needs: No Transportation Needs (2024)    PRAPARE -  "Transportation    • Lack of Transportation (Medical): No    • Lack of Transportation (Non-Medical): No   Housing Stability: Low Risk  (5/20/2024)    Housing Stability Vital Sign    • Unable to Pay for Housing in the Last Year: No    • Number of Times Moved in the Last Year: 0    • Homeless in the Last Year: No   Utilities: Not At Risk (5/20/2024)    [facility] Utilities    • Threatened with loss of utilities: No     No results found.    Objective     /82 (BP Location: Left arm, Patient Position: Sitting)   Pulse 60   Temp 97.9 °F (36.6 °C) (Tympanic)   Ht 5' 9.29\" (1.76 m)   Wt 107 kg (236 lb 8 oz)   SpO2 97%   BMI 34.63 kg/m²     Physical Exam  Vitals and nursing note reviewed.   Constitutional:       General: He is not in acute distress.     Appearance: He is well-developed.   HENT:      Head: Normocephalic and atraumatic.   Eyes:      Conjunctiva/sclera: Conjunctivae normal.   Cardiovascular:      Rate and Rhythm: Normal rate and regular rhythm.      Heart sounds: No murmur heard.  Pulmonary:      Effort: Pulmonary effort is normal. No respiratory distress.      Breath sounds: Normal breath sounds.   Abdominal:      Palpations: Abdomen is soft.      Tenderness: There is no abdominal tenderness.   Musculoskeletal:         General: No swelling.      Cervical back: Neck supple.   Skin:     General: Skin is warm and dry.      Capillary Refill: Capillary refill takes less than 2 seconds.   Neurological:      Mental Status: He is alert.   Psychiatric:         Mood and Affect: Mood normal.       Administrative Statements   I have spent a total time of 30 minutes on 05/24/24 In caring for this patient including Diagnostic results.        "

## 2024-05-24 NOTE — ASSESSMENT & PLAN NOTE
Mobic 15 mg One Tab Daily x 7 days then 15 mg PRN  Not interested in xray and deferred follow up with Ortho for now

## 2024-05-24 NOTE — ASSESSMENT & PLAN NOTE
Increased the dose of Lisinopril to 30 mg Qday  Reviewed the patient's renal function and no concerns at this time.

## 2024-05-29 ENCOUNTER — TELEPHONE (OUTPATIENT)
Age: 69
End: 2024-05-29

## 2024-05-29 DIAGNOSIS — M16.11 PRIMARY OSTEOARTHRITIS OF RIGHT HIP: Primary | ICD-10-CM

## 2024-06-19 ENCOUNTER — APPOINTMENT (OUTPATIENT)
Dept: RADIOLOGY | Facility: CLINIC | Age: 69
End: 2024-06-19
Payer: MEDICARE

## 2024-06-19 DIAGNOSIS — M16.11 PRIMARY OSTEOARTHRITIS OF RIGHT HIP: ICD-10-CM

## 2024-06-19 PROCEDURE — 73502 X-RAY EXAM HIP UNI 2-3 VIEWS: CPT

## 2024-06-24 ENCOUNTER — HOSPITAL ENCOUNTER (OUTPATIENT)
Dept: ULTRASOUND IMAGING | Facility: HOSPITAL | Age: 69
Discharge: HOME | End: 2024-06-24
Payer: MEDICARE

## 2024-06-24 DIAGNOSIS — Z13.6 SCREENING FOR AAA (ABDOMINAL AORTIC ANEURYSM): ICD-10-CM

## 2024-06-24 LAB — COLOGUARD RESULT REPORTABLE: NEGATIVE

## 2024-06-24 PROCEDURE — 76775 US EXAM ABDO BACK WALL LIM: CPT

## 2024-07-01 ENCOUNTER — TELEPHONE (OUTPATIENT)
Dept: INTERNAL MEDICINE CLINIC | Facility: CLINIC | Age: 69
End: 2024-07-01

## 2024-07-01 NOTE — TELEPHONE ENCOUNTER
----- Message from Ramakrishna Lopez DO sent at 6/30/2024  8:50 PM EDT -----  Abdominal Aortic Aneurysm screen was negative

## 2024-11-04 ENCOUNTER — CONSULT (OUTPATIENT)
Dept: INTERNAL MEDICINE CLINIC | Facility: CLINIC | Age: 69
End: 2024-11-04
Payer: MEDICARE

## 2024-11-04 VITALS
HEART RATE: 63 BPM | SYSTOLIC BLOOD PRESSURE: 156 MMHG | HEIGHT: 69 IN | WEIGHT: 238.8 LBS | DIASTOLIC BLOOD PRESSURE: 90 MMHG | TEMPERATURE: 97.6 F | OXYGEN SATURATION: 97 % | BODY MASS INDEX: 35.37 KG/M2

## 2024-11-04 DIAGNOSIS — E78.00 PURE HYPERCHOLESTEROLEMIA: ICD-10-CM

## 2024-11-04 DIAGNOSIS — E66.01 OBESITY, MORBID (HCC): ICD-10-CM

## 2024-11-04 DIAGNOSIS — Z23 ENCOUNTER FOR IMMUNIZATION: Primary | ICD-10-CM

## 2024-11-04 DIAGNOSIS — Z01.818 PRE-OP EXAMINATION: ICD-10-CM

## 2024-11-04 DIAGNOSIS — Z11.59 NEED FOR HEPATITIS C SCREENING TEST: ICD-10-CM

## 2024-11-04 DIAGNOSIS — I1A.0 RESISTANT HYPERTENSION: ICD-10-CM

## 2024-11-04 PROCEDURE — 93000 ELECTROCARDIOGRAM COMPLETE: CPT | Performed by: INTERNAL MEDICINE

## 2024-11-04 PROCEDURE — 99213 OFFICE O/P EST LOW 20 MIN: CPT | Performed by: INTERNAL MEDICINE

## 2024-11-04 NOTE — PROGRESS NOTES
Pre-operative Clearance  Name: Fausto Burk      : 1955      MRN: [id number]  Encounter Provider: Ramakrishna Lopez DO  Encounter Date: 2024   Encounter department: [facility]    Assessment & Plan  Need for hepatitis C screening test  Declined       Encounter for immunization  Declined       Pre-op examination  Cleared for surgery and low cardiac risk  Orders:    POCT ECG    Obesity, morbid (HCC)  Difficult to ambulate at this time.  However, post op exercise would be the next step.             Resistant hypertension  Improved with recheck  Reactive component  Continue the Lisinopril 30 mg Qday       Pure hypercholesterolemia  The patient notes Rosovastatin 10 mg QDay       Pre-operative Clearance:     Revised Cardiac Risk Index:  RCI RISK CLASS I (0 risk factors, risk of major cardiac complications approximately 0.5%)    Clearance:  Patient is medically optimized (CLEARED) for proposed surgery without any additional cardiac testing.      Medication Instructions:   - Avoid herbs or non-directed vitamins one week prior to surgery    - Avoid aspirin containing medications or non-steroidal anti-inflammatory drugs one week preceding surgery    - May take tylenol for pain up until the night before surgery    - ACE Inhibitors or ARBs: Continue this medication up to the evening before surgery/procedure, but do not take the morning of the day of surgery.  - Hyperlipidemia meds: Continue to take this medication on your normal schedule.       History of Present Illness     Pre-op Exam  Surgery: Right THR  Anticipated Date of Surgery: 2024  Surgeon: Dr Farfan    Previous history of bleeding disorders or clots?: No  Previous Anesthesia reaction?: No  Prolonged steroid use in the last 6 months?: No    Assessment of Cardiac Risk:   - Unstable or severe angina or MI in the last 6 weeks or history of stent placement in the last year?: No   - Decompensated heart failure (e.g. New onset heart  failure, NYHA  Class IV heart failure, or worsening existing heart failure)?: No  - Significant arrhythmias such as high grade AV block, symptomatic ventricular arrhythmia, newly recognized ventricular tachycardia, supraventricular tachycardia with resting heart rate >100, or symptomatic bradycardia?: No  - Severe heart valve disease including aortic stenosis or symptomatic mitral stenosis?: No      Pre-operative Risk Factors:  Elevated-risk surgery: No    History of cerebrovascular disease: No    History of ischemic heart disease: No  Pre-operative treatment with insulin: No  Pre-operative creatinine >2 mg/dL: No    History of congestive heart failure: No    Duke Activity Status Index (DASI):   DASI Total Score: 58.2  METs: 9.9    Medications of Perioperative Concern:   NSAIDs and ACE inhibitors/ARBs    Review of Systems  Past Medical History   Past Medical History:   Diagnosis Date    Hypertension 2/14/2023    Obesity 2/14/2023     Past Surgical History:   Procedure Laterality Date    PA EXCISION MULTIPLE EXTERNAL PAPILLAE/TAGS ANUS N/A 3/11/2024    Procedure: HEMORRHOIDECTOMY EXCISION;  Surgeon: FELIPE Swenson MD;  Location: [facility];  Service: Colorectal     Family History   Problem Relation Age of Onset    Dementia Mother     Heart disease Mother     Stroke Father     Heart disease Father     Hearing loss Father      Social History     Tobacco Use    Smoking status: Former     Types: Cigarettes     Passive exposure: Past    Smokeless tobacco: Never   Vaping Use    Vaping status: Never Used   Substance and Sexual Activity    Alcohol use: Yes     Comment: social    Drug use: Never    Sexual activity: Yes     Partners: Female     Birth control/protection: None     Current Outpatient Medications on File Prior to Visit   Medication Sig    lisinopril (ZESTRIL) 30 mg tablet Take 1 tablet (30 mg total) by mouth daily    rosuvastatin (CRESTOR) 10 MG tablet Take 1 tablet (10 mg total) by mouth daily     "[DISCONTINUED] meloxicam (MOBIC) 15 mg tablet Take 1 tablet (15 mg total) by mouth daily as needed (Muscular skeletal pain) (Patient not taking: Reported on 11/4/2024)    [DISCONTINUED] sildenafil (VIAGRA) 100 mg tablet Take 1 tablet (100 mg total) by mouth daily as needed for erectile dysfunction (Patient not taking: Reported on 11/4/2024)     No Known Allergies  Objective     /90   Pulse 63   Temp 97.6 °F (36.4 °C) (Tympanic)   Ht 5' 9.2\" (1.758 m)   Wt 108 kg (238 lb 12.8 oz)   SpO2 97%   BMI 35.06 kg/m²     Physical Exam      Ramakrishna Lopez, DO  "

## 2024-11-04 NOTE — PATIENT INSTRUCTIONS
Pre-operative Medication Instructions    Avoid herbs or non-directed vitamins one week prior to surgery  Avoid aspirin containing medications or non-steroidal anti-inflammatory drugs one week preceding surgery  May take tylenol for pain up until the night before surgery    ACE Inhibitors or ARBs     Medication Name     lisinopril (ZESTRIL) 30 mg tablet      Continue this medication up to the evening before surgery/procedure, but do not take the morning of the day of surgery.    Cholesterol lowering meds     Medication Name     rosuvastatin (CRESTOR) 10 MG tablet      Continue to take this medication on your normal schedule.  If this is an oral medication and you take it in the morning, then you may take this medicine with a sip of water.

## 2025-05-05 ENCOUNTER — OFFICE VISIT (OUTPATIENT)
Dept: INTERNAL MEDICINE CLINIC | Facility: CLINIC | Age: 70
End: 2025-05-05
Payer: MEDICARE

## 2025-05-05 VITALS
HEART RATE: 66 BPM | BODY MASS INDEX: 37.92 KG/M2 | TEMPERATURE: 98.6 F | WEIGHT: 256 LBS | SYSTOLIC BLOOD PRESSURE: 180 MMHG | DIASTOLIC BLOOD PRESSURE: 92 MMHG | OXYGEN SATURATION: 97 % | HEIGHT: 69 IN

## 2025-05-05 DIAGNOSIS — I10 PRIMARY HYPERTENSION: ICD-10-CM

## 2025-05-05 DIAGNOSIS — I1A.0 RESISTANT HYPERTENSION: Primary | ICD-10-CM

## 2025-05-05 DIAGNOSIS — E78.00 PURE HYPERCHOLESTEROLEMIA: ICD-10-CM

## 2025-05-05 DIAGNOSIS — M19.071 OSTEOARTHRITIS OF FIRST METATARSOPHALANGEAL (MTP) JOINT OF RIGHT FOOT: ICD-10-CM

## 2025-05-05 DIAGNOSIS — Z11.59 NEED FOR HEPATITIS C SCREENING TEST: ICD-10-CM

## 2025-05-05 DIAGNOSIS — E66.01 OBESITY, MORBID (HCC): ICD-10-CM

## 2025-05-05 PROCEDURE — G2211 COMPLEX E/M VISIT ADD ON: HCPCS | Performed by: INTERNAL MEDICINE

## 2025-05-05 PROCEDURE — 99214 OFFICE O/P EST MOD 30 MIN: CPT | Performed by: INTERNAL MEDICINE

## 2025-05-05 RX ORDER — ROSUVASTATIN CALCIUM 10 MG/1
10 TABLET, COATED ORAL DAILY
Qty: 90 TABLET | Refills: 3 | Status: SHIPPED | OUTPATIENT
Start: 2025-05-05 | End: 2026-04-30

## 2025-05-05 RX ORDER — LISINOPRIL 40 MG/1
40 TABLET ORAL DAILY
Qty: 90 TABLET | Refills: 3 | Status: SHIPPED | OUTPATIENT
Start: 2025-05-05 | End: 2026-04-30

## 2025-05-05 NOTE — ASSESSMENT & PLAN NOTE
We will increase the Lisinopril to 40 mg Qday  Follow up next visit on BP  Orders:  •  Comprehensive metabolic panel; Future  •  Lipid Panel with Direct LDL reflex; Future  •  CBC and differential; Future

## 2025-05-05 NOTE — ASSESSMENT & PLAN NOTE
See above  Orders:  •  lisinopril (ZESTRIL) 40 mg tablet; Take 1 tablet (40 mg total) by mouth daily

## 2025-05-05 NOTE — ASSESSMENT & PLAN NOTE
LDL cholesterol noted to be elevated   Follow up on the LDL cholesterol  Orders:  •  rosuvastatin (CRESTOR) 10 MG tablet; Take 1 tablet (10 mg total) by mouth daily

## 2025-05-05 NOTE — ASSESSMENT & PLAN NOTE
Discussed and noted we could follow up with an xray of the foot (declined)  OTC Tylenol 650 mg Q6 Hours  OTC NSAIDs if needed

## 2025-05-05 NOTE — PROGRESS NOTES
"Name: Fausto Burk      : 1955      MRN: [id number]  Encounter Provider: Ramakrishna Lopez DO  Encounter Date: 2025   Encounter department: [facility]  :  Assessment & Plan  Need for hepatitis C screening test  Deferred  Orders:  •  Hepatitis C Antibody; Future    Resistant hypertension  We will increase the Lisinopril to 40 mg Qday  Follow up next visit on BP  Orders:  •  Comprehensive metabolic panel; Future  •  Lipid Panel with Direct LDL reflex; Future  •  CBC and differential; Future    Pure hypercholesterolemia  LDL cholesterol noted to be elevated   Follow up on the LDL cholesterol  Orders:  •  rosuvastatin (CRESTOR) 10 MG tablet; Take 1 tablet (10 mg total) by mouth daily    Primary hypertension  See above  Orders:  •  lisinopril (ZESTRIL) 40 mg tablet; Take 1 tablet (40 mg total) by mouth daily    Obesity, morbid (HCC)    Increase activity        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   HENT: Negative.     Respiratory:  Negative for cough, chest tightness and shortness of breath.    Cardiovascular:  Negative for chest pain and palpitations.   Gastrointestinal:  Negative for abdominal pain, constipation, diarrhea, nausea and vomiting.   Genitourinary: Negative.    Musculoskeletal:  Negative for back pain and myalgias.   Skin: Negative.    Neurological: Negative.    Psychiatric/Behavioral:  Negative for dysphoric mood. The patient is not nervous/anxious.        Objective   BP (!) 180/92 (Patient Position: Sitting, Cuff Size: Large)   Pulse 66   Temp 98.6 °F (37 °C) (Tympanic)   Ht 5' 9.25\" (1.759 m)   Wt 116 kg (256 lb)   SpO2 97%   BMI 37.53 kg/m²      Physical Exam  Vitals " and nursing note reviewed.   Constitutional:       General: He is not in acute distress.     Appearance: He is well-developed.   HENT:      Head: Normocephalic and atraumatic.   Eyes:      Conjunctiva/sclera: Conjunctivae normal.   Cardiovascular:      Rate and Rhythm: Normal rate and regular rhythm.      Heart sounds: No murmur heard.  Pulmonary:      Effort: Pulmonary effort is normal. No respiratory distress.      Breath sounds: Normal breath sounds.   Abdominal:      Palpations: Abdomen is soft.      Tenderness: There is no abdominal tenderness.   Musculoskeletal:         General: No swelling.      Cervical back: Neck supple.   Skin:     General: Skin is warm and dry.      Capillary Refill: Capillary refill takes less than 2 seconds.   Neurological:      Mental Status: He is alert.   Psychiatric:         Mood and Affect: Mood normal.

## 2025-05-13 ENCOUNTER — APPOINTMENT (OUTPATIENT)
Dept: LAB | Facility: CLINIC | Age: 70
End: 2025-05-13
Attending: INTERNAL MEDICINE
Payer: MEDICARE

## 2025-05-13 DIAGNOSIS — Z11.59 NEED FOR HEPATITIS C SCREENING TEST: ICD-10-CM

## 2025-05-13 DIAGNOSIS — I1A.0 RESISTANT HYPERTENSION: ICD-10-CM

## 2025-05-13 LAB
ALBUMIN SERPL BCG-MCNC: 4.3 G/DL (ref 3.5–5)
ALP SERPL-CCNC: 46 U/L (ref 34–104)
ALT SERPL W P-5'-P-CCNC: 28 U/L (ref 7–52)
ANION GAP SERPL CALCULATED.3IONS-SCNC: 7 MMOL/L (ref 4–13)
AST SERPL W P-5'-P-CCNC: 28 U/L (ref 13–39)
BASOPHILS # BLD AUTO: 0.04 THOUSANDS/ÂΜL (ref 0–0.1)
BASOPHILS NFR BLD AUTO: 1 % (ref 0–1)
BILIRUB SERPL-MCNC: 0.54 MG/DL (ref 0.2–1)
BUN SERPL-MCNC: 18 MG/DL (ref 5–25)
CALCIUM SERPL-MCNC: 8.8 MG/DL (ref 8.4–10.2)
CHLORIDE SERPL-SCNC: 102 MMOL/L (ref 96–108)
CHOLEST SERPL-MCNC: 200 MG/DL (ref ?–200)
CO2 SERPL-SCNC: 25 MMOL/L (ref 21–32)
CREAT SERPL-MCNC: 0.92 MG/DL (ref 0.6–1.3)
EOSINOPHIL # BLD AUTO: 0 THOUSAND/ÂΜL (ref 0–0.61)
EOSINOPHIL NFR BLD AUTO: 0 % (ref 0–6)
ERYTHROCYTE [DISTWIDTH] IN BLOOD BY AUTOMATED COUNT: 12.9 % (ref 11.6–15.1)
GFR SERPL CREATININE-BSD FRML MDRD: 84 ML/MIN/1.73SQ M
GLUCOSE P FAST SERPL-MCNC: 97 MG/DL (ref 65–99)
HCT VFR BLD AUTO: 42.2 % (ref 36.5–49.3)
HDLC SERPL-MCNC: 59 MG/DL
HGB BLD-MCNC: 14 G/DL (ref 12–17)
IMM GRANULOCYTES # BLD AUTO: 0.01 THOUSAND/UL (ref 0–0.2)
IMM GRANULOCYTES NFR BLD AUTO: 0 % (ref 0–2)
LDLC SERPL CALC-MCNC: 128 MG/DL (ref 0–100)
LYMPHOCYTES # BLD AUTO: 1.81 THOUSANDS/ÂΜL (ref 0.6–4.47)
LYMPHOCYTES NFR BLD AUTO: 35 % (ref 14–44)
MCH RBC QN AUTO: 29.2 PG (ref 26.8–34.3)
MCHC RBC AUTO-ENTMCNC: 33.2 G/DL (ref 31.4–37.4)
MCV RBC AUTO: 88 FL (ref 82–98)
MONOCYTES # BLD AUTO: 0.59 THOUSAND/ÂΜL (ref 0.17–1.22)
MONOCYTES NFR BLD AUTO: 11 % (ref 4–12)
NEUTROPHILS # BLD AUTO: 2.71 THOUSANDS/ÂΜL (ref 1.85–7.62)
NEUTS SEG NFR BLD AUTO: 53 % (ref 43–75)
NRBC BLD AUTO-RTO: 0 /100 WBCS
PLATELET # BLD AUTO: 212 THOUSANDS/UL (ref 149–390)
PMV BLD AUTO: 11 FL (ref 8.9–12.7)
POTASSIUM SERPL-SCNC: 4.2 MMOL/L (ref 3.5–5.3)
PROT SERPL-MCNC: 6.6 G/DL (ref 6.4–8.4)
RBC # BLD AUTO: 4.8 MILLION/UL (ref 3.88–5.62)
SODIUM SERPL-SCNC: 134 MMOL/L (ref 135–147)
TRIGL SERPL-MCNC: 63 MG/DL (ref ?–150)
WBC # BLD AUTO: 5.16 THOUSAND/UL (ref 4.31–10.16)

## 2025-05-13 PROCEDURE — 86803 HEPATITIS C AB TEST: CPT

## 2025-05-13 PROCEDURE — 85025 COMPLETE CBC W/AUTO DIFF WBC: CPT

## 2025-05-13 PROCEDURE — 80061 LIPID PANEL: CPT

## 2025-05-13 PROCEDURE — 36415 COLL VENOUS BLD VENIPUNCTURE: CPT

## 2025-05-13 PROCEDURE — 80053 COMPREHEN METABOLIC PANEL: CPT

## 2025-05-14 LAB — HCV AB SER QL: NORMAL

## 2025-05-18 ENCOUNTER — RESULTS FOLLOW-UP (OUTPATIENT)
Dept: INTERNAL MEDICINE CLINIC | Facility: CLINIC | Age: 70
End: 2025-05-18

## 2025-05-19 NOTE — TELEPHONE ENCOUNTER
----- Message from Ramakrishna Lopez DO sent at 5/18/2025  9:47 PM EDT -----  Mild changes on labs and we will discuss at our next follow up  ----- Message -----  From: Lab, Background User  Sent: 5/13/2025   7:30 PM EDT  To: Ramakrishna Lopez DO

## 2025-07-16 ENCOUNTER — APPOINTMENT (OUTPATIENT)
Dept: LAB | Facility: CLINIC | Age: 70
End: 2025-07-16
Attending: INTERNAL MEDICINE
Payer: MEDICARE

## 2025-07-16 ENCOUNTER — OFFICE VISIT (OUTPATIENT)
Dept: INTERNAL MEDICINE CLINIC | Facility: CLINIC | Age: 70
End: 2025-07-16
Payer: MEDICARE

## 2025-07-16 VITALS
WEIGHT: 247 LBS | DIASTOLIC BLOOD PRESSURE: 90 MMHG | OXYGEN SATURATION: 97 % | BODY MASS INDEX: 36.58 KG/M2 | HEART RATE: 64 BPM | HEIGHT: 69 IN | SYSTOLIC BLOOD PRESSURE: 148 MMHG | TEMPERATURE: 97.4 F

## 2025-07-16 DIAGNOSIS — N30.01 ACUTE CYSTITIS WITH HEMATURIA: Primary | ICD-10-CM

## 2025-07-16 DIAGNOSIS — R31.0 GROSS HEMATURIA: ICD-10-CM

## 2025-07-16 LAB
ALBUMIN SERPL BCG-MCNC: 4.2 G/DL (ref 3.5–5)
ALP SERPL-CCNC: 52 U/L (ref 34–104)
ALT SERPL W P-5'-P-CCNC: 30 U/L (ref 7–52)
ANION GAP SERPL CALCULATED.3IONS-SCNC: 10 MMOL/L (ref 4–13)
AST SERPL W P-5'-P-CCNC: 30 U/L (ref 13–39)
BASOPHILS # BLD AUTO: 0.06 THOUSANDS/ÂΜL (ref 0–0.1)
BASOPHILS NFR BLD AUTO: 1 % (ref 0–1)
BILIRUB SERPL-MCNC: 0.41 MG/DL (ref 0.2–1)
BILIRUB UR QL STRIP: NEGATIVE
BUN SERPL-MCNC: 21 MG/DL (ref 5–25)
CALCIUM SERPL-MCNC: 9.6 MG/DL (ref 8.4–10.2)
CHLORIDE SERPL-SCNC: 102 MMOL/L (ref 96–108)
CLARITY UR: CLEAR
CO2 SERPL-SCNC: 27 MMOL/L (ref 21–32)
COLOR UR: YELLOW
CREAT SERPL-MCNC: 0.87 MG/DL (ref 0.6–1.3)
EOSINOPHIL # BLD AUTO: 0.07 THOUSAND/ÂΜL (ref 0–0.61)
EOSINOPHIL NFR BLD AUTO: 1 % (ref 0–6)
ERYTHROCYTE [DISTWIDTH] IN BLOOD BY AUTOMATED COUNT: 11.9 % (ref 11.6–15.1)
GFR SERPL CREATININE-BSD FRML MDRD: 88 ML/MIN/1.73SQ M
GLUCOSE P FAST SERPL-MCNC: 100 MG/DL (ref 65–99)
GLUCOSE UR STRIP-MCNC: NEGATIVE MG/DL
HCT VFR BLD AUTO: 41.9 % (ref 36.5–49.3)
HGB BLD-MCNC: 14 G/DL (ref 12–17)
HGB UR QL STRIP.AUTO: NEGATIVE
IMM GRANULOCYTES # BLD AUTO: 0.01 THOUSAND/UL (ref 0–0.2)
IMM GRANULOCYTES NFR BLD AUTO: 0 % (ref 0–2)
KETONES UR STRIP-MCNC: NEGATIVE MG/DL
LEUKOCYTE ESTERASE UR QL STRIP: NEGATIVE
LYMPHOCYTES # BLD AUTO: 1.69 THOUSANDS/ÂΜL (ref 0.6–4.47)
LYMPHOCYTES NFR BLD AUTO: 29 % (ref 14–44)
MCH RBC QN AUTO: 29.8 PG (ref 26.8–34.3)
MCHC RBC AUTO-ENTMCNC: 33.4 G/DL (ref 31.4–37.4)
MCV RBC AUTO: 89 FL (ref 82–98)
MONOCYTES # BLD AUTO: 0.56 THOUSAND/ÂΜL (ref 0.17–1.22)
MONOCYTES NFR BLD AUTO: 10 % (ref 4–12)
NEUTROPHILS # BLD AUTO: 3.36 THOUSANDS/ÂΜL (ref 1.85–7.62)
NEUTS SEG NFR BLD AUTO: 59 % (ref 43–75)
NITRITE UR QL STRIP: NEGATIVE
NRBC BLD AUTO-RTO: 0 /100 WBCS
PH UR STRIP.AUTO: 6 [PH]
PLATELET # BLD AUTO: 256 THOUSANDS/UL (ref 149–390)
PMV BLD AUTO: 10.5 FL (ref 8.9–12.7)
POTASSIUM SERPL-SCNC: 4.9 MMOL/L (ref 3.5–5.3)
PROT SERPL-MCNC: 7 G/DL (ref 6.4–8.4)
PROT UR STRIP-MCNC: NEGATIVE MG/DL
PSA FREE MFR SERPL: 8.17 %
PSA FREE SERPL-MCNC: 0.6 NG/ML
PSA SERPL-MCNC: 7.36 NG/ML (ref 0–4)
RBC # BLD AUTO: 4.7 MILLION/UL (ref 3.88–5.62)
SODIUM SERPL-SCNC: 139 MMOL/L (ref 135–147)
SP GR UR STRIP.AUTO: 1.02 (ref 1–1.03)
UROBILINOGEN UR STRIP-ACNC: <2 MG/DL
WBC # BLD AUTO: 5.75 THOUSAND/UL (ref 4.31–10.16)

## 2025-07-16 PROCEDURE — 81003 URINALYSIS AUTO W/O SCOPE: CPT | Performed by: INTERNAL MEDICINE

## 2025-07-16 PROCEDURE — 99214 OFFICE O/P EST MOD 30 MIN: CPT | Performed by: INTERNAL MEDICINE

## 2025-07-16 PROCEDURE — 84153 ASSAY OF PSA TOTAL: CPT | Performed by: INTERNAL MEDICINE

## 2025-07-16 PROCEDURE — 84154 ASSAY OF PSA FREE: CPT | Performed by: INTERNAL MEDICINE

## 2025-07-16 PROCEDURE — 36415 COLL VENOUS BLD VENIPUNCTURE: CPT | Performed by: INTERNAL MEDICINE

## 2025-07-16 PROCEDURE — 85025 COMPLETE CBC W/AUTO DIFF WBC: CPT | Performed by: INTERNAL MEDICINE

## 2025-07-16 PROCEDURE — G2211 COMPLEX E/M VISIT ADD ON: HCPCS | Performed by: INTERNAL MEDICINE

## 2025-07-16 PROCEDURE — 80053 COMPREHEN METABOLIC PANEL: CPT | Performed by: INTERNAL MEDICINE

## 2025-07-16 RX ORDER — SULFAMETHOXAZOLE AND TRIMETHOPRIM 800; 160 MG/1; MG/1
1 TABLET ORAL 2 TIMES DAILY
Qty: 14 TABLET | Refills: 0 | Status: SHIPPED | OUTPATIENT
Start: 2025-07-16 | End: 2025-07-23

## 2025-07-16 RX ORDER — AMOXICILLIN 500 MG/1
500 TABLET, FILM COATED ORAL
COMMUNITY
Start: 2025-05-28

## 2025-07-17 NOTE — PROGRESS NOTES
"Name: Fausto Burk      : 1955      MRN: [id number]  Encounter Provider: Ramakrishna Lopez DO  Encounter Date: 2025   Encounter department: [facility]  :  Assessment & Plan  Acute cystitis with hematuria    Orders:  •  UA w Reflex to Microscopic w Reflex to Culture  •  Comprehensive metabolic panel  •  CBC and differential  •  sulfamethoxazole-trimethoprim (BACTRIM DS) 800-160 mg per tablet; Take 1 tablet by mouth in the morning and 1 tablet before bedtime. Do all this for 7 days.    Gross hematuria    Orders:  •  US kidney and bladder; Future  •  CT renal stone study abdomen pelvis wo contrast; Future  •  PSA, total and free      Assessment & Plan           History of Present Illness   The patient was noted to have issues with several episodes of hematuria.  The patient states that this has resolved.  The patient notes no fevers or chills.  He states no nausea or vomiting.  He has a remote history of smoking.  He has no history of nephrolithiasis.      Urinary Frequency   Associated symptoms include frequency. Pertinent negatives include no chills, hematuria or vomiting.     Review of Systems   Constitutional:  Negative for chills and fever.   HENT:  Negative for ear pain and sore throat.    Eyes:  Negative for pain and visual disturbance.   Respiratory:  Negative for cough and shortness of breath.    Cardiovascular:  Negative for chest pain and palpitations.   Gastrointestinal:  Negative for abdominal pain and vomiting.   Genitourinary:  Positive for frequency. Negative for dysuria and hematuria.   Musculoskeletal:  Negative for arthralgias and back pain.   Skin:  Negative for color change and rash.   Neurological:  Negative for seizures and syncope.   All other systems reviewed and are negative.      Objective   /90 Comment: nervous and also didnt take BP meds this Am  Pulse 64   Temp (!) 97.4 °F (36.3 °C) (Tympanic)   Ht 5' 9\" (1.753 m)   Wt 112 kg (247 lb)   SpO2 97%   " BMI 36.48 kg/m²      Physical Exam  Vitals and nursing note reviewed.   Constitutional:       General: He is not in acute distress.     Appearance: He is well-developed.   HENT:      Head: Normocephalic and atraumatic.     Eyes:      Conjunctiva/sclera: Conjunctivae normal.       Cardiovascular:      Rate and Rhythm: Normal rate and regular rhythm.      Heart sounds: No murmur heard.  Pulmonary:      Effort: Pulmonary effort is normal. No respiratory distress.      Breath sounds: Normal breath sounds.   Abdominal:      Palpations: Abdomen is soft.      Tenderness: There is no abdominal tenderness.     Musculoskeletal:         General: No swelling.      Cervical back: Neck supple.     Skin:     General: Skin is warm and dry.      Capillary Refill: Capillary refill takes less than 2 seconds.     Neurological:      Mental Status: He is alert.     Psychiatric:         Mood and Affect: Mood normal.

## 2025-07-21 ENCOUNTER — HOSPITAL ENCOUNTER (OUTPATIENT)
Dept: ULTRASOUND IMAGING | Facility: HOSPITAL | Age: 70
Discharge: HOME | End: 2025-07-21
Payer: MEDICARE

## 2025-07-21 DIAGNOSIS — R31.0 GROSS HEMATURIA: ICD-10-CM

## 2025-07-21 PROCEDURE — 76775 US EXAM ABDO BACK WALL LIM: CPT

## 2025-07-30 ENCOUNTER — HOSPITAL ENCOUNTER (OUTPATIENT)
Dept: CT IMAGING | Facility: HOSPITAL | Age: 70
Discharge: HOME | End: 2025-07-30
Attending: INTERNAL MEDICINE
Payer: MEDICARE

## 2025-07-30 DIAGNOSIS — R31.0 GROSS HEMATURIA: ICD-10-CM

## 2025-07-30 PROCEDURE — 74176 CT ABD & PELVIS W/O CONTRAST: CPT

## (undated) DEVICE — SUT CHROMIC 2-0 CT-2 27 IN 883H

## (undated) DEVICE — VESSEL LOOPS X-RAY DETECTABLE: Brand: DEROYAL

## (undated) DEVICE — 3000CC GUARDIAN II: Brand: GUARDIAN

## (undated) DEVICE — TIBURON PEDIATRIC DRAPE: Brand: CONVERTORS

## (undated) DEVICE — SUT VICRYL 5-0 RB-1 27 IN J213H

## (undated) DEVICE — SPONGE STICK WITH PVP-I: Brand: KENDALL

## (undated) DEVICE — LUBRICANT JELLY SURGILUBE TUBE 4OZ FLIP TOP

## (undated) DEVICE — PLUMEPEN PRO 10FT

## (undated) DEVICE — ABDOMINAL PAD: Brand: DERMACEA

## (undated) DEVICE — SUT VICRYL 3-0 SH 27 IN J416H

## (undated) DEVICE — POOLE SUCTION HANDLE: Brand: CARDINAL HEALTH

## (undated) DEVICE — NEEDLE 25G X 1 1/2

## (undated) DEVICE — DISPOSABLE BRIEF/UNDERWEAR

## (undated) DEVICE — INTENDED FOR TISSUE SEPARATION, AND OTHER PROCEDURES THAT REQUIRE A SHARP SURGICAL BLADE TO PUNCTURE OR CUT.: Brand: BARD-PARKER ® CARBON RIB-BACK BLADES

## (undated) DEVICE — SPONGE LAP 18 X 4 IN

## (undated) DEVICE — SYRINGE 20ML LL

## (undated) DEVICE — 3M™ DURAPORE™ SURGICAL TAPE 1538-3, 3 INCH X 10 YARD (7,5CM X 9,1M), 4 ROLLS/BOX: Brand: 3M™ DURAPORE™

## (undated) DEVICE — HEAVY DUTY TABLE COVER: Brand: CONVERTORS

## (undated) DEVICE — SCD SEQUENTIAL COMPRESSION COMFORT SLEEVE MEDIUM KNEE LENGTH: Brand: KENDALL SCD

## (undated) DEVICE — PAD GROUNDING DUAL ADULT

## (undated) DEVICE — PENCIL ELECTROSURG E-Z CLEAN -0035H

## (undated) DEVICE — SUT CHROMIC 2-0 SH 27 IN G123H

## (undated) DEVICE — GAUZE SPONGES,16 PLY: Brand: CURITY

## (undated) DEVICE — GLOVE INDICATOR PI UNDERGLOVE SZ 8 BLUE

## (undated) DEVICE — GLOVE SRG BIOGEL 7.5

## (undated) DEVICE — 1820 FOAM BLOCK NEEDLE COUNTER: Brand: DEVON

## (undated) DEVICE — SUT CHROMIC 3-0 SH 27 IN G122H

## (undated) DEVICE — SYRINGE 10ML LL

## (undated) DEVICE — SPONGE LAP 18 X 18 IN

## (undated) DEVICE — BETHLEHEM UNIVERSAL MINOR GEN: Brand: CARDINAL HEALTH

## (undated) DEVICE — SUT PDS II 4-0 SH 27 IN Z315H

## (undated) DEVICE — BASIC SINGLE BASIN 2-LF: Brand: MEDLINE INDUSTRIES, INC.

## (undated) DEVICE — IV CATH INTROCAN 18G X 1 1/4 SAFETY

## (undated) DEVICE — ASTOUND STANDARD SURGICAL GOWN, XL: Brand: CONVERTORS

## (undated) DEVICE — TUBING SUCTION 5MM X 12 FT

## (undated) DEVICE — LIGHT GLOVE GREEN

## (undated) DEVICE — SKIN MARKER DUAL TIP WITH RULER CAP, FLEXIBLE RULER AND LABELS: Brand: DEVON

## (undated) DEVICE — MAYO STAND COVER: Brand: CONVERTORS